# Patient Record
Sex: MALE | Race: WHITE | NOT HISPANIC OR LATINO | Employment: FULL TIME | URBAN - METROPOLITAN AREA
[De-identification: names, ages, dates, MRNs, and addresses within clinical notes are randomized per-mention and may not be internally consistent; named-entity substitution may affect disease eponyms.]

---

## 2018-04-10 LAB — HEMOCCULT STL QL IA: NORMAL

## 2018-04-13 ENCOUNTER — TELEPHONE (OUTPATIENT)
Dept: FAMILY MEDICINE | Facility: CLINIC | Age: 48
End: 2018-04-13

## 2018-04-13 NOTE — TELEPHONE ENCOUNTER
Sammy for patient       ----- Message from Dona Pascual DO sent at 4/13/2018  1:20 PM EDT -----  LeT patient know his stool card was negative for blood

## 2018-06-03 PROBLEM — Z00.00 ROUTINE GENERAL MEDICAL EXAMINATION AT A HEALTH CARE FACILITY: Status: ACTIVE | Noted: 2018-06-03

## 2018-06-03 RX ORDER — AZELASTINE 1 MG/ML
2 SPRAY, METERED NASAL 2 TIMES DAILY
COMMUNITY
Start: 2015-11-03 | End: 2021-07-07 | Stop reason: ALTCHOICE

## 2018-06-04 ENCOUNTER — OFFICE VISIT (OUTPATIENT)
Dept: FAMILY MEDICINE | Facility: CLINIC | Age: 48
End: 2018-06-04
Attending: FAMILY MEDICINE
Payer: COMMERCIAL

## 2018-06-04 VITALS
BODY MASS INDEX: 35.63 KG/M2 | RESPIRATION RATE: 16 BRPM | WEIGHT: 227 LBS | HEART RATE: 54 BPM | DIASTOLIC BLOOD PRESSURE: 70 MMHG | HEIGHT: 67 IN | SYSTOLIC BLOOD PRESSURE: 112 MMHG | TEMPERATURE: 98.2 F

## 2018-06-04 DIAGNOSIS — R41.3 MEMORY DIFFICULTIES: ICD-10-CM

## 2018-06-04 DIAGNOSIS — J30.9 ALLERGIC RHINITIS, UNSPECIFIED CHRONICITY, UNSPECIFIED SEASONALITY, UNSPECIFIED TRIGGER: ICD-10-CM

## 2018-06-04 DIAGNOSIS — E78.6 LOW HDL (UNDER 40): ICD-10-CM

## 2018-06-04 DIAGNOSIS — Z00.00 ROUTINE GENERAL MEDICAL EXAMINATION AT A HEALTH CARE FACILITY: Primary | ICD-10-CM

## 2018-06-04 PROBLEM — M79.672 LEFT FOOT PAIN: Status: ACTIVE | Noted: 2018-06-04

## 2018-06-04 PROCEDURE — 36415 COLL VENOUS BLD VENIPUNCTURE: CPT | Performed by: FAMILY MEDICINE

## 2018-06-04 PROCEDURE — 99396 PREV VISIT EST AGE 40-64: CPT | Mod: 25 | Performed by: FAMILY MEDICINE

## 2018-06-04 RX ORDER — IBUPROFEN AND FAMOTIDINE 26.6; 8 MG/1; MG/1
TABLET ORAL 3 TIMES DAILY
COMMUNITY
End: 2020-06-16 | Stop reason: ALTCHOICE

## 2018-06-04 ASSESSMENT — ENCOUNTER SYMPTOMS
HEMATOLOGIC/LYMPHATIC NEGATIVE: 1
EYES NEGATIVE: 1
GASTROINTESTINAL NEGATIVE: 1
PSYCHIATRIC NEGATIVE: 1
ENDOCRINE NEGATIVE: 1
CARDIOVASCULAR NEGATIVE: 1
CONSTITUTIONAL NEGATIVE: 1

## 2018-06-04 NOTE — ASSESSMENT & PLAN NOTE
Have routine labs  Consider sleep study due to snoring. Per pt only mild though and when sleeps on stomach does not have this  Tdap up to date  Did stool card for family hx of grandparent with colon cancer.  neg

## 2018-06-04 NOTE — PROGRESS NOTES
Subjective      Patient ID: Virgil Mejia is a 47 y.o. male.    Pt is here for a Physical;  He is seeing a podiatrist for left foot pain .  He is on duexis now. Slowly improving.  He feels ok. Denies any specific complaints except occasionally will feel like his memory is a little funny. He will feel like he is in a conversation and will forget what conversation about.  Exercises without chest pain sob dizziness etc.         Tobacco  Allergies  Meds  Problems  Med Hx  Surg Hx  Fam Hx       Review of Systems   Constitutional: Negative.    HENT: Negative.    Eyes: Negative.    Cardiovascular: Negative.    Gastrointestinal: Negative.    Endocrine: Negative.    Genitourinary: Negative.    Musculoskeletal: Positive for gait problem.        Left  Foot pain   Allergic/Immunologic: Positive for environmental allergies.   Hematological: Negative.    Psychiatric/Behavioral: Negative.      Allergies   Allergen Reactions   • No Known Allergies      Current Outpatient Prescriptions   Medication Sig Dispense Refill   • azelastine (ASTELIN) 137 mcg (0.1 %) nasal spray Administer 2 sprays into affected nostril(s) 2 (two) times a day.     • ibuprofen-famotidine (DUEXIS) 800-26.6 mg tablet Take by mouth 3 (three) times a day.       No current facility-administered medications for this visit.      Past Medical History:   Diagnosis Date   • Allergic rhinitis 8/2/2013    Overview:    • Back pain 8/2/2013    Overview:      Past Surgical History:   Procedure Laterality Date   • ELBOW SURGERY Right     right radial head surgery     Social History   Substance Use Topics   • Smoking status: Never Smoker   • Smokeless tobacco: Never Used   • Alcohol use Not on file     Family History   Problem Relation Age of Onset   • Cancer Maternal Grandmother    • Diabetes Maternal Grandmother    • Colon cancer Paternal Grandfather        Objective   Vitals:    06/04/18 0758   BP: 112/70   Pulse: (!) 54   Resp: 16   Temp: 36.8 °C (98.2 °F)   Weight:  "103 kg (227 lb)   Height: 1.689 m (5' 6.5\")    Body mass index is 36.09 kg/m².  Physical Exam   Constitutional: He is oriented to person, place, and time. He appears well-developed and well-nourished.   HENT:   Right Ear: External ear normal.   Left Ear: External ear normal.   Mouth/Throat: Oropharynx is clear and moist.   Eyes: Conjunctivae and EOM are normal. Pupils are equal, round, and reactive to light.   Neck: Normal range of motion. Neck supple.   Cardiovascular: Normal rate, regular rhythm and normal heart sounds.    Pulmonary/Chest: Effort normal and breath sounds normal.   Abdominal: Soft. Bowel sounds are normal.   Genitourinary: Penis normal.   Genitourinary Comments: Scrotal exam neg. No lymph nodes    Musculoskeletal: Normal range of motion.   Neurological: He is alert and oriented to person, place, and time.   Psychiatric: He has a normal mood and affect.       Assessment/Plan   Problem List Items Addressed This Visit     Allergic rhinitis     Prn astelin         Relevant Orders    Comprehensive metabolic panel    Routine general medical examination at a health care facility - Primary     Have routine labs  Consider sleep study due to snoring. Per pt only mild though and when sleeps on stomach does not have this  Tdap up to date  Did stool card for family hx of grandparent with colon cancer.  neg         Relevant Orders    CBC and Differential    Comprehensive metabolic panel    Lipid panel    PSA    TSH 3rd Generation    Urinalysis with microscopic    Vitamin D 25 hydroxy    Memory difficulties     Check labs  Keep log and rtn in 3 months to go over specific complaints         Relevant Orders    Vitamin B12    Vitamin D 25 hydroxy      Other Visit Diagnoses     Low HDL (under 40)        Relevant Orders    Lipid panel          Patient agrees and verbalizes understanding of medication and treatment plan discussed at today's visit.  Patient was instructed to call or follow-up if symptoms persist or " worsen.    No notes on file    Dona Pascual, DO    This note was created with voice recognition software. Inadvertent dictation errors should be disregarded. Please contact my office for clarification.

## 2018-06-05 LAB — VIT B12 SERPL-MCNC: 404 PG/ML (ref 200–1100)

## 2018-06-06 LAB
25(OH)D3 SERPL-MCNC: 21 NG/ML (ref 30–100)
ALBUMIN SERPL-MCNC: 4.2 G/DL (ref 3.6–5.1)
ALBUMIN/GLOB SERPL: 1.9 (CALC) (ref 1–2.5)
ALP SERPL-CCNC: 73 U/L (ref 40–115)
ALT SERPL-CCNC: 24 U/L (ref 9–46)
AST SERPL-CCNC: 21 U/L (ref 10–40)
BASOPHILS # BLD AUTO: 60 CELLS/UL (ref 0–200)
BASOPHILS NFR BLD AUTO: 1.4 %
BILIRUB SERPL-MCNC: 0.4 MG/DL (ref 0.2–1.2)
BUN SERPL-MCNC: 14 MG/DL (ref 7–25)
BUN/CREAT SERPL: NORMAL (CALC) (ref 6–22)
CALCIUM SERPL-MCNC: 9.3 MG/DL (ref 8.6–10.3)
CHLORIDE SERPL-SCNC: 105 MMOL/L (ref 98–110)
CHOLEST SERPL-MCNC: 201 MG/DL
CHOLEST/HDLC SERPL: 6.9 (CALC)
CO2 SERPL-SCNC: 26 MMOL/L (ref 20–31)
CREAT SERPL-MCNC: 1.18 MG/DL (ref 0.6–1.35)
EOSINOPHIL # BLD AUTO: 327 CELLS/UL (ref 15–500)
EOSINOPHIL NFR BLD AUTO: 7.6 %
ERYTHROCYTE [DISTWIDTH] IN BLOOD BY AUTOMATED COUNT: 12.3 % (ref 11–15)
GFR SERPL CREATININE-BSD FRML MDRD: 73 ML/MIN/1.73M2
GLOBULIN SER CALC-MCNC: 2.2 G/DL (CALC) (ref 1.9–3.7)
GLUCOSE SERPL-MCNC: 96 MG/DL (ref 65–99)
HCT VFR BLD AUTO: 42.9 % (ref 38.5–50)
HDLC SERPL-MCNC: 29 MG/DL
HGB BLD-MCNC: 14.9 G/DL (ref 13.2–17.1)
LDLC SERPL CALC-MCNC: 134 MG/DL (CALC)
LYMPHOCYTES # BLD AUTO: 1410 CELLS/UL (ref 850–3900)
LYMPHOCYTES NFR BLD AUTO: 32.8 %
MCH RBC QN AUTO: 32.5 PG (ref 27–33)
MCHC RBC AUTO-ENTMCNC: 34.7 G/DL (ref 32–36)
MCV RBC AUTO: 93.5 FL (ref 80–100)
MONOCYTES # BLD AUTO: 538 CELLS/UL (ref 200–950)
MONOCYTES NFR BLD AUTO: 12.5 %
NEUTROPHILS # BLD AUTO: 1965 CELLS/UL (ref 1500–7800)
NEUTROPHILS NFR BLD AUTO: 45.7 %
NONHDLC SERPL-MCNC: 172 MG/DL (CALC)
PLATELET # BLD AUTO: 225 THOUSAND/UL (ref 140–400)
PMV BLD REES-ECKER: 9.7 FL (ref 7.5–12.5)
POTASSIUM SERPL-SCNC: 4.4 MMOL/L (ref 3.5–5.3)
PROT SERPL-MCNC: 6.4 G/DL (ref 6.1–8.1)
PSA SERPL-MCNC: 0.5 NG/ML
RBC # BLD AUTO: 4.59 MILLION/UL (ref 4.2–5.8)
SODIUM SERPL-SCNC: 139 MMOL/L (ref 135–146)
TRIGL SERPL-MCNC: 238 MG/DL
TSH SERPL-ACNC: 1.76 MIU/L (ref 0.4–4.5)
WBC # BLD AUTO: 4.3 THOUSAND/UL (ref 3.8–10.8)

## 2018-06-07 ENCOUNTER — TELEPHONE (OUTPATIENT)
Dept: FAMILY MEDICINE | Facility: CLINIC | Age: 48
End: 2018-06-07

## 2018-06-07 NOTE — TELEPHONE ENCOUNTER
lmom for  patient        ----- Message from Dona Pascual DO sent at 6/6/2018 11:36 PM EDT -----  Let pt know his CBC is ok, his CMP is ok, his chol is 201   . His bad chol is very low at 29.  ( would suggest starting a fish oil tablet 1000 mg 2 x day)  And re ck the lipids in 6 months The LDL is little high at 134,   Would like < 120 .  His PSA is ok and his TSH is ok . His vit D level is low.  Suggest starting vit d supplement 100 IU daily and a little unprotected sun over the summer to get some vit D is ok   ( ie 10 min sun before putting on sunscreen)

## 2018-06-07 NOTE — PROGRESS NOTES
Let pt know his CBC is ok, his CMP is ok, his chol is 201   . His bad chol is very low at 29.  ( would suggest starting a fish oil tablet 1000 mg 2 x day)  And re ck the lipids in 6 months The LDL is little high at 134,   Would like < 120 .  His PSA is ok and his TSH is ok . His vit D level is low.  Suggest starting vit d supplement 100 IU daily and a little unprotected sun over the summer to get some vit D is ok   ( ie 10 min sun before putting on sunscreen)

## 2018-11-09 ENCOUNTER — TELEPHONE (OUTPATIENT)
Dept: FAMILY MEDICINE | Facility: CLINIC | Age: 48
End: 2018-11-09

## 2019-06-09 NOTE — ASSESSMENT & PLAN NOTE
Labs reviewed  Vit D was low, suppelement and observed, recheck today  B12 was in lower normal range last year.  B12 added.  Recheck today  Patient does not really notice a difference however he does agree it probably is just the busyness of life run I think out of the ordinary.  We will continue to observe with keeping a log

## 2019-06-09 NOTE — ASSESSMENT & PLAN NOTE
Physical today  Labs from last year reviewed and HDL was low  Pt started  fish oil tab and increased exercise. See what HDL is today     Tdap up-to-date  Referral for colonoscopy sent as he will turn 49 in a month

## 2019-06-09 NOTE — ASSESSMENT & PLAN NOTE
Lab Results   Component Value Date    ALT 24 06/04/2018    AST 21 06/04/2018    ALKPHOS 73 06/04/2018    BILITOT 0.4 06/04/2018     Normal now

## 2019-06-10 ENCOUNTER — OFFICE VISIT (OUTPATIENT)
Dept: FAMILY MEDICINE | Facility: CLINIC | Age: 49
End: 2019-06-10
Payer: COMMERCIAL

## 2019-06-10 VITALS
HEART RATE: 72 BPM | SYSTOLIC BLOOD PRESSURE: 116 MMHG | WEIGHT: 233.8 LBS | TEMPERATURE: 97.8 F | DIASTOLIC BLOOD PRESSURE: 80 MMHG | RESPIRATION RATE: 16 BRPM | HEIGHT: 67 IN | BODY MASS INDEX: 36.7 KG/M2

## 2019-06-10 DIAGNOSIS — R41.3 MEMORY DIFFICULTIES: ICD-10-CM

## 2019-06-10 DIAGNOSIS — Z12.11 COLON CANCER SCREENING: ICD-10-CM

## 2019-06-10 DIAGNOSIS — M54.9 ACUTE LEFT-SIDED BACK PAIN, UNSPECIFIED BACK LOCATION: ICD-10-CM

## 2019-06-10 DIAGNOSIS — R74.01 ELEVATED ALT MEASUREMENT: ICD-10-CM

## 2019-06-10 DIAGNOSIS — Z00.00 ROUTINE GENERAL MEDICAL EXAMINATION AT A HEALTH CARE FACILITY: Primary | ICD-10-CM

## 2019-06-10 DIAGNOSIS — J30.9 ALLERGIC RHINITIS, UNSPECIFIED SEASONALITY, UNSPECIFIED TRIGGER: ICD-10-CM

## 2019-06-10 PROBLEM — M79.672 LEFT FOOT PAIN: Status: RESOLVED | Noted: 2018-06-04 | Resolved: 2019-06-10

## 2019-06-10 PROCEDURE — 99396 PREV VISIT EST AGE 40-64: CPT | Performed by: FAMILY MEDICINE

## 2019-06-10 RX ORDER — CYCLOBENZAPRINE HCL 10 MG
10 TABLET ORAL 3 TIMES DAILY PRN
Qty: 30 TABLET | Refills: 1 | Status: SHIPPED | OUTPATIENT
Start: 2019-06-10 | End: 2020-06-16 | Stop reason: SDUPTHER

## 2019-06-10 ASSESSMENT — ENCOUNTER SYMPTOMS
NEUROLOGICAL NEGATIVE: 1
BACK PAIN: 1
CONSTITUTIONAL NEGATIVE: 1
PSYCHIATRIC NEGATIVE: 1
GASTROINTESTINAL NEGATIVE: 1
RESPIRATORY NEGATIVE: 1
CARDIOVASCULAR NEGATIVE: 1
HEMATOLOGIC/LYMPHATIC NEGATIVE: 1

## 2019-06-10 NOTE — ASSESSMENT & PLAN NOTE
Add muscle relaxant.  Okay to use ibuprofen as needed  Would like to have forms completed to get coverage for accupuncture which helps.  Ambulatory referral to acupuncture given

## 2019-06-10 NOTE — PROGRESS NOTES
"Subjective      Patient ID: Virgil Mejia is a 48 y.o. male.    Pt is here for physica.  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  He hurt his back a little.  Taking 8 Ibuprofen a day now.  Not improving.   Stretching         Tobacco  Meds  Med Hx  Surg Hx  Fam Hx       Review of Systems   Constitutional: Negative.    HENT: Negative.    Respiratory: Negative.    Cardiovascular: Negative.    Gastrointestinal: Negative.    Musculoskeletal: Positive for back pain.   Skin: Negative.    Neurological: Negative.    Hematological: Negative.    Psychiatric/Behavioral: Negative.      Allergies   Allergen Reactions   • No Known Allergies      Current Outpatient Prescriptions   Medication Sig Dispense Refill   • azelastine (ASTELIN) 137 mcg (0.1 %) nasal spray Administer 2 sprays into affected nostril(s) 2 (two) times a day.     • cyclobenzaprine (FLEXERIL) 10 mg tablet Take 1 tablet (10 mg total) by mouth 3 (three) times a day as needed for muscle spasms. 30 tablet 1   • ibuprofen-famotidine (DUEXIS) 800-26.6 mg tablet Take by mouth 3 (three) times a day.       No current facility-administered medications for this visit.      Past Medical History:   Diagnosis Date   • Allergic rhinitis 8/2/2013    Overview:    • Back pain 8/2/2013    Overview:      Past Surgical History:   Procedure Laterality Date   • ELBOW SURGERY Right     right radial head surgery     Social History   Substance Use Topics   • Smoking status: Never Smoker   • Smokeless tobacco: Never Used   • Alcohol use Yes      Comment: socially     Family History   Problem Relation Age of Onset   • Cancer Maternal Grandmother    • Diabetes Maternal Grandmother    • Colon cancer Paternal Grandfather        Objective   Vitals:    06/10/19 0808   BP: 116/80   Pulse: 72   Resp: 16   Temp: 36.6 °C (97.8 °F)   Weight: 106 kg (233 lb 12.8 oz)   Height: 1.689 m (5' 6.5\")    Body mass index is 37.17 kg/m².  Physical Exam   Constitutional: He appears well-developed " and well-nourished.   HENT:   Head: Normocephalic.   Eyes: Pupils are equal, round, and reactive to light.   Neck: Normal range of motion.   Cardiovascular: Normal rate and regular rhythm.    Pulmonary/Chest: Effort normal and breath sounds normal.   Abdominal: Soft.   Musculoskeletal: Normal range of motion. He exhibits tenderness (right SI low back pain ).   Neurological: He is alert.   Skin: Skin is warm.   Psychiatric: He has a normal mood and affect.   Vitals reviewed.      Assessment/Plan   Problem List Items Addressed This Visit     Allergic rhinitis     Prn astelin         Back pain     Add muscle relaxant.  Okay to use ibuprofen as needed  Would like to have forms completed to get coverage for accupuncture which helps.  Ambulatory referral to acupuncture given         Relevant Medications    cyclobenzaprine (FLEXERIL) 10 mg tablet    Other Relevant Orders    Ambulatory referral for Acupuncture    Elevated ALT measurement     Lab Results   Component Value Date    ALT 24 06/04/2018    AST 21 06/04/2018    ALKPHOS 73 06/04/2018    BILITOT 0.4 06/04/2018     Normal now         Routine general medical examination at a health care facility - Primary     Physical today  Labs from last year reviewed and HDL was low  Pt started  fish oil tab and increased exercise. See what HDL is today     Tdap up-to-date  Referral for colonoscopy sent as he will turn 49 in a month           Relevant Orders    CBC and Differential    Comprehensive metabolic panel    Lipid panel    TSH 3rd Generation    Urinalysis with microscopic    PSA    BMI 36.0-36.9,adult     Suggest regular exercise  Increase aerobic exercise          Memory difficulties     Labs reviewed  Vit D was low, suppelement and observed, recheck today  B12 was in lower normal range last year.  B12 added.  Recheck today  Patient does not really notice a difference however he does agree it probably is just the busyness of life run I think out of the ordinary.  We will  continue to observe with keeping a log         Relevant Orders    CBC and Differential    Comprehensive metabolic panel    Lipid panel    TSH 3rd Generation    Urinalysis with microscopic    PSA      Other Visit Diagnoses     Colon cancer screening        Relevant Orders    [facility] Colonoscopy [facility]          Patient agrees and verbalizes understanding of medication and treatment plan discussed at today's visit.  Patient was instructed to call or follow-up if symptoms persist or worsen.    No notes on file    Dona Pascual, DO      This note was created with voice recognition software. Inadvertent dictation errors should be disregarded. Please contact my office for clarification.

## 2019-06-11 LAB
ALBUMIN SERPL-MCNC: 4.1 G/DL (ref 3.6–5.1)
ALBUMIN/GLOB SERPL: 1.6 (CALC) (ref 1–2.5)
ALP SERPL-CCNC: 75 U/L (ref 40–115)
ALT SERPL-CCNC: 44 U/L (ref 9–46)
AMORPH SED URNS QL MICRO: (no result) /HPF
APPEARANCE UR: (no result)
AST SERPL-CCNC: 41 U/L (ref 10–40)
BACTERIA #/AREA URNS HPF: (no result) /HPF
BASOPHILS # BLD AUTO: 38 CELLS/UL (ref 0–200)
BASOPHILS NFR BLD AUTO: 0.5 %
BILIRUB SERPL-MCNC: 0.6 MG/DL (ref 0.2–1.2)
BILIRUB UR QL STRIP: NEGATIVE
BUN SERPL-MCNC: 17 MG/DL (ref 7–25)
BUN/CREAT SERPL: ABNORMAL (CALC) (ref 6–22)
CALCIUM SERPL-MCNC: 9.4 MG/DL (ref 8.6–10.3)
CHLORIDE SERPL-SCNC: 104 MMOL/L (ref 98–110)
CHOLEST SERPL-MCNC: 219 MG/DL
CHOLEST/HDLC SERPL: 6.8 (CALC)
CO2 SERPL-SCNC: 25 MMOL/L (ref 20–32)
COLOR UR: (no result)
CREAT SERPL-MCNC: 1.22 MG/DL (ref 0.6–1.35)
EOSINOPHIL # BLD AUTO: 315 CELLS/UL (ref 15–500)
EOSINOPHIL NFR BLD AUTO: 4.2 %
ERYTHROCYTE [DISTWIDTH] IN BLOOD BY AUTOMATED COUNT: 12.7 % (ref 11–15)
GLOBULIN SER CALC-MCNC: 2.6 G/DL (CALC) (ref 1.9–3.7)
GLUCOSE SERPL-MCNC: 96 MG/DL (ref 65–99)
GLUCOSE UR QL STRIP: NEGATIVE
HCT VFR BLD AUTO: 44.4 % (ref 38.5–50)
HDLC SERPL-MCNC: 32 MG/DL
HGB BLD-MCNC: 15.3 G/DL (ref 13.2–17.1)
HGB UR QL STRIP: NEGATIVE
HYALINE CASTS #/AREA URNS LPF: (no result) /LPF
KETONES UR QL STRIP: (no result)
LDLC SERPL CALC-MCNC: 148 MG/DL (CALC)
LEUKOCYTE ESTERASE UR QL STRIP: NEGATIVE
LYMPHOCYTES # BLD AUTO: 1155 CELLS/UL (ref 850–3900)
LYMPHOCYTES NFR BLD AUTO: 15.4 %
MCH RBC QN AUTO: 31.4 PG (ref 27–33)
MCHC RBC AUTO-ENTMCNC: 34.5 G/DL (ref 32–36)
MCV RBC AUTO: 91.2 FL (ref 80–100)
MONOCYTES # BLD AUTO: 623 CELLS/UL (ref 200–950)
MONOCYTES NFR BLD AUTO: 8.3 %
NEUTROPHILS # BLD AUTO: 5370 CELLS/UL (ref 1500–7800)
NEUTROPHILS NFR BLD AUTO: 71.6 %
NITRITE UR QL STRIP: NEGATIVE
NONHDLC SERPL-MCNC: 187 MG/DL (CALC)
PH UR STRIP: 5.5 [PH] (ref 5–8)
PLATELET # BLD AUTO: 230 THOUSAND/UL (ref 140–400)
PMV BLD REES-ECKER: 9.5 FL (ref 7.5–12.5)
POTASSIUM SERPL-SCNC: 4.3 MMOL/L (ref 3.5–5.3)
PROT SERPL-MCNC: 6.7 G/DL (ref 6.1–8.1)
PROT UR QL STRIP: NEGATIVE
PSA SERPL-MCNC: 0.5 NG/ML
QUEST EGFR NON-AFR. AMERICAN: 69 ML/MIN/1.73M2
RBC # BLD AUTO: 4.87 MILLION/UL (ref 4.2–5.8)
RBC #/AREA URNS HPF: (no result) /HPF
SERVICE CMNT-IMP: (no result)
SODIUM SERPL-SCNC: 139 MMOL/L (ref 135–146)
SP GR UR STRIP: 1.03 (ref 1–1.03)
SQUAMOUS #/AREA URNS HPF: (no result) /HPF
TRIGL SERPL-MCNC: 253 MG/DL
TSH SERPL-ACNC: 1.76 MIU/L (ref 0.4–4.5)
WBC # BLD AUTO: 7.5 THOUSAND/UL (ref 3.8–10.8)
WBC #/AREA URNS HPF: (no result) /HPF

## 2020-01-13 RX ORDER — OSELTAMIVIR PHOSPHATE 75 MG/1
75 CAPSULE ORAL DAILY
Qty: 10 CAPSULE | Refills: 0 | Status: SHIPPED | OUTPATIENT
Start: 2020-01-13 | End: 2020-01-23

## 2020-06-16 ENCOUNTER — OFFICE VISIT (OUTPATIENT)
Dept: FAMILY MEDICINE | Facility: CLINIC | Age: 50
End: 2020-06-16
Payer: COMMERCIAL

## 2020-06-16 ENCOUNTER — TELEPHONE (OUTPATIENT)
Dept: FAMILY MEDICINE | Facility: CLINIC | Age: 50
End: 2020-06-16

## 2020-06-16 VITALS
HEART RATE: 72 BPM | DIASTOLIC BLOOD PRESSURE: 82 MMHG | SYSTOLIC BLOOD PRESSURE: 126 MMHG | WEIGHT: 236 LBS | RESPIRATION RATE: 14 BRPM | BODY MASS INDEX: 37.04 KG/M2 | TEMPERATURE: 96.1 F | HEIGHT: 67 IN

## 2020-06-16 DIAGNOSIS — M54.9 ACUTE LEFT-SIDED BACK PAIN, UNSPECIFIED BACK LOCATION: ICD-10-CM

## 2020-06-16 DIAGNOSIS — J30.9 ALLERGIC RHINITIS, UNSPECIFIED SEASONALITY, UNSPECIFIED TRIGGER: ICD-10-CM

## 2020-06-16 DIAGNOSIS — R79.89 LOW VITAMIN D LEVEL: ICD-10-CM

## 2020-06-16 DIAGNOSIS — Z12.11 COLON CANCER SCREENING: ICD-10-CM

## 2020-06-16 DIAGNOSIS — Z86.69 H/O TINNITUS: ICD-10-CM

## 2020-06-16 DIAGNOSIS — Z00.00 ROUTINE GENERAL MEDICAL EXAMINATION AT A HEALTH CARE FACILITY: Primary | ICD-10-CM

## 2020-06-16 DIAGNOSIS — R74.01 ELEVATED ALT MEASUREMENT: ICD-10-CM

## 2020-06-16 PROBLEM — R41.3 MEMORY DIFFICULTIES: Status: RESOLVED | Noted: 2018-06-04 | Resolved: 2020-06-16

## 2020-06-16 PROCEDURE — 36415 COLL VENOUS BLD VENIPUNCTURE: CPT | Performed by: FAMILY MEDICINE

## 2020-06-16 PROCEDURE — 99396 PREV VISIT EST AGE 40-64: CPT | Performed by: FAMILY MEDICINE

## 2020-06-16 RX ORDER — CYCLOBENZAPRINE HCL 10 MG
10 TABLET ORAL 3 TIMES DAILY PRN
Qty: 30 TABLET | Refills: 0 | Status: SHIPPED | OUTPATIENT
Start: 2020-06-16 | End: 2020-06-16 | Stop reason: ALTCHOICE

## 2020-06-16 RX ORDER — METAXALONE 800 MG/1
800 TABLET ORAL 3 TIMES DAILY
Qty: 30 TABLET | Refills: 0 | Status: SHIPPED | OUTPATIENT
Start: 2020-06-16 | End: 2020-06-26

## 2020-06-16 ASSESSMENT — ENCOUNTER SYMPTOMS
ENDOCRINE NEGATIVE: 1
EYES NEGATIVE: 1
CONSTIPATION: 0
ABDOMINAL DISTENTION: 0
MUSCULOSKELETAL NEGATIVE: 1
NEUROLOGICAL NEGATIVE: 1
RESPIRATORY NEGATIVE: 1
CARDIOVASCULAR NEGATIVE: 1
DIARRHEA: 0
CONSTITUTIONAL NEGATIVE: 1
BLOOD IN STOOL: 1
NAUSEA: 0
ANAL BLEEDING: 0
PSYCHIATRIC NEGATIVE: 1

## 2020-06-16 NOTE — ASSESSMENT & PLAN NOTE
Constant.  Does yoga.  Will use prn flexeril and or advil   Feels like flexeril does not help so changed to trial of skelaxin  Stretch/weight loss

## 2020-06-16 NOTE — ASSESSMENT & PLAN NOTE
All the time - no significant change  Has seen audiologist in past  - had mild hearing loss  Has seen ENT, can go back for repeat hearing test and evaluation

## 2020-06-16 NOTE — PROGRESS NOTES
Subjective      Patient ID: Virgil Mejia is a 49 y.o. male.    Pt is here for his physical. He is doing well in general but does admit to some muscle aches at times related to his lifting and working out. He is having some left achilles pain that just started.  Will make appt with ortho, sees [facility]  Sometimes he will  pain in the chest out of the blue but it is not exertional.  Also some nerve pain on left arm that shoots down arm when leans on desk,  Never has any chest pain with exertion.  Works out 4-5 x week , cardio and weights.  He has not had colonoscopy yet.  Occasionally will have bright red blood with itching.       Tobacco  Allergies  Meds  Problems  Med Hx  Surg Hx  Fam Hx       Review of Systems   Constitutional: Negative.    HENT: Negative.    Eyes: Negative.    Respiratory: Negative.    Cardiovascular: Negative.    Gastrointestinal: Positive for blood in stool (occasional with wipt, thinks hemmorhoid). Negative for abdominal distention, anal bleeding, constipation, diarrhea and nausea.   Endocrine: Negative.    Genitourinary: Negative.    Musculoskeletal: Negative.    Skin: Negative.    Allergic/Immunologic: Positive for environmental allergies (seasonal).   Neurological: Negative.    Psychiatric/Behavioral: Negative.      Allergies   Allergen Reactions   • No Known Allergies      Current Outpatient Medications   Medication Sig Dispense Refill   • azelastine (ASTELIN) 137 mcg (0.1 %) nasal spray Administer 2 sprays into affected nostril(s) 2 (two) times a day.     • metaxalone (SKELAXIN) 800 mg tablet Take 1 tablet (800 mg total) by mouth 3 (three) times a day for 10 days. 30 tablet 0     No current facility-administered medications for this visit.      Past Medical History:   Diagnosis Date   • Allergic rhinitis 8/2/2013    Overview:    • Back pain 8/2/2013    Overview:      Past Surgical History:   Procedure Laterality Date   • ELBOW SURGERY Right     right radial head  "surgery     Social History     Tobacco Use   • Smoking status: Never Smoker   • Smokeless tobacco: Never Used   Substance Use Topics   • Alcohol use: Yes     Comment: socially   • Drug use: Not on file     Family History   Problem Relation Age of Onset   • Cancer Maternal Grandmother    • Diabetes Maternal Grandmother    • Colon cancer Paternal Grandfather        Objective   Vitals:    06/16/20 0758   BP: 126/82   Pulse: 72   Resp: 14   Temp: (!) 35.6 °C (96.1 °F)   Weight: 107 kg (236 lb)   Height: 1.702 m (5' 7\")    Body mass index is 36.96 kg/m².  Physical Exam   Constitutional: He is oriented to person, place, and time. He appears well-developed and well-nourished.   HENT:   Head: Normocephalic and atraumatic.   Mouth/Throat: Oropharynx is clear and moist.   Eyes: Pupils are equal, round, and reactive to light. EOM are normal.   Neck: Normal range of motion. Neck supple. No thyromegaly present.   Cardiovascular: Normal rate and regular rhythm.   No murmur heard.  Pulmonary/Chest: Effort normal and breath sounds normal.   Abdominal: Soft. Bowel sounds are normal.   Genitourinary: Rectum normal, prostate normal and penis normal. Rectal exam shows guaiac negative stool.   Musculoskeletal: Normal range of motion.   Lymphadenopathy:     He has no cervical adenopathy.   Neurological: He is alert and oriented to person, place, and time. He displays normal reflexes.   Skin: Skin is warm and dry.   Psychiatric: He has a normal mood and affect.   Vitals reviewed.      Assessment/Plan   Problem List Items Addressed This Visit        Nervous    Back pain     Constant.  Does yoga.  Will use prn flexeril and or advil   Feels like flexeril does not help so changed to trial of skelaxin  Stretch/weight loss            Respiratory    Allergic rhinitis     Not using any meds  otc as needed          Relevant Orders    CBC and Differential    Comprehensive metabolic panel    Lipid panel    TSH 3rd Generation    Urinalysis with " microscopic    PSA    Vitamin B12    Vitamin D 25 hydroxy       Other    Elevated ALT measurement     Re ck due today      Lab Results   Component Value Date    ALT 44 06/10/2019    AST 41 (H) 06/10/2019    ALKPHOS 75 06/10/2019    BILITOT 0.6 06/10/2019              H/O tinnitus     All the time - no significant change  Has seen audiologist in past  - had mild hearing loss  Has seen ENT, can go back for repeat hearing test and evaluation          Routine general medical examination at a health care facility - Primary     Pt will have routine labs  Referred to GI for routine colonoscopy - Dr Garcia  Have PSA with routine labs.  Up to date with Tdap  Pt had shingles per hx ( consider immunization)          Relevant Orders    CBC and Differential    Comprehensive metabolic panel    Lipid panel    TSH 3rd Generation    Urinalysis with microscopic    PSA    Vitamin B12    Vitamin D 25 hydroxy    Ambulatory referral to Gastroenterology    BMI 36.0-36.9,adult     Regular aerobic exercise . Healthy diet recommended  Labs ordered          Low vitamin D level     Last level was low 21  Check level          Relevant Orders    Vitamin D 25 hydroxy    Colon cancer screening     Referred to Dr Garcia to call for colonoscopy  Pt will schedule  Rectal exam neg for occult blood           Relevant Orders    Ambulatory referral to Gastroenterology      n    Patient agrees and verbalizes understanding of medication and treatment plan discussed at today's visit.  Patient was instructed to call or follow-up if symptoms persist or worsen.    No notes on file    Dona Pascual, DO      This note was created with voice recognition software. Inadvertent dictation errors should be disregarded. Please contact my office for clarification.

## 2020-06-16 NOTE — TELEPHONE ENCOUNTER
----- Message from Dona Pascual DO sent at 6/16/2020  8:30 AM EDT -----  Can u cancel the cyclobenzeprine

## 2020-06-16 NOTE — ASSESSMENT & PLAN NOTE
Referred to Dr Garcia to call for colonoscopy  Pt will schedule  Rectal exam neg for occult blood

## 2020-06-16 NOTE — ASSESSMENT & PLAN NOTE
Re ck due today      Lab Results   Component Value Date    ALT 44 06/10/2019    AST 41 (H) 06/10/2019    ALKPHOS 75 06/10/2019    BILITOT 0.6 06/10/2019

## 2020-06-16 NOTE — ASSESSMENT & PLAN NOTE
Pt will have routine labs  Referred to GI for routine colonoscopy - Dr Garcia  Have PSA with routine labs.  Up to date with Tdap  Pt had shingles per hx ( consider immunization)

## 2020-06-17 LAB
25(OH)D3 SERPL-MCNC: 22 NG/ML (ref 30–100)
ALBUMIN SERPL-MCNC: 4 G/DL (ref 3.6–5.1)
ALBUMIN/GLOB SERPL: 1.7 (CALC) (ref 1–2.5)
ALP SERPL-CCNC: 72 U/L (ref 35–144)
ALT SERPL-CCNC: 30 U/L (ref 9–46)
APPEARANCE UR: ABNORMAL
AST SERPL-CCNC: 23 U/L (ref 10–35)
BACTERIA #/AREA URNS HPF: ABNORMAL /HPF
BASOPHILS # BLD AUTO: 49 CELLS/UL (ref 0–200)
BASOPHILS NFR BLD AUTO: 0.9 %
BILIRUB SERPL-MCNC: 0.4 MG/DL (ref 0.2–1.2)
BILIRUB UR QL STRIP: NEGATIVE
BUN SERPL-MCNC: 12 MG/DL (ref 7–25)
BUN/CREAT SERPL: NORMAL (CALC) (ref 6–22)
CALCIUM SERPL-MCNC: 9.2 MG/DL (ref 8.6–10.3)
CHLORIDE SERPL-SCNC: 106 MMOL/L (ref 98–110)
CHOLEST SERPL-MCNC: 218 MG/DL
CHOLEST/HDLC SERPL: 7 (CALC)
CO2 SERPL-SCNC: 25 MMOL/L (ref 20–32)
COLOR UR: YELLOW
CREAT SERPL-MCNC: 1.13 MG/DL (ref 0.7–1.33)
EOSINOPHIL # BLD AUTO: 340 CELLS/UL (ref 15–500)
EOSINOPHIL NFR BLD AUTO: 6.3 %
ERYTHROCYTE [DISTWIDTH] IN BLOOD BY AUTOMATED COUNT: 12.5 % (ref 11–15)
GLOBULIN SER CALC-MCNC: 2.4 G/DL (CALC) (ref 1.9–3.7)
GLUCOSE SERPL-MCNC: 97 MG/DL (ref 65–99)
GLUCOSE UR QL STRIP: NEGATIVE
HCT VFR BLD AUTO: 44.3 % (ref 38.5–50)
HDLC SERPL-MCNC: 31 MG/DL
HGB BLD-MCNC: 15.3 G/DL (ref 13.2–17.1)
HGB UR QL STRIP: NEGATIVE
HYALINE CASTS #/AREA URNS LPF: ABNORMAL /LPF
KETONES UR QL STRIP: NEGATIVE
LDLC SERPL CALC-MCNC: 144 MG/DL (CALC)
LEUKOCYTE ESTERASE UR QL STRIP: NEGATIVE
LYMPHOCYTES # BLD AUTO: 1690 CELLS/UL (ref 850–3900)
LYMPHOCYTES NFR BLD AUTO: 31.3 %
MCH RBC QN AUTO: 31.7 PG (ref 27–33)
MCHC RBC AUTO-ENTMCNC: 34.5 G/DL (ref 32–36)
MCV RBC AUTO: 91.7 FL (ref 80–100)
MONOCYTES # BLD AUTO: 562 CELLS/UL (ref 200–950)
MONOCYTES NFR BLD AUTO: 10.4 %
NEUTROPHILS # BLD AUTO: 2759 CELLS/UL (ref 1500–7800)
NEUTROPHILS NFR BLD AUTO: 51.1 %
NITRITE UR QL STRIP: NEGATIVE
NONHDLC SERPL-MCNC: 187 MG/DL (CALC)
PH UR STRIP: 5.5 [PH] (ref 5–8)
PLATELET # BLD AUTO: 254 THOUSAND/UL (ref 140–400)
PMV BLD REES-ECKER: 9.4 FL (ref 7.5–12.5)
POTASSIUM SERPL-SCNC: 4.2 MMOL/L (ref 3.5–5.3)
PROT SERPL-MCNC: 6.4 G/DL (ref 6.1–8.1)
PROT UR QL STRIP: NEGATIVE
PSA SERPL-MCNC: 0.5 NG/ML
QUEST EGFR NON-AFR. AMERICAN: 75 ML/MIN/1.73M2
RBC # BLD AUTO: 4.83 MILLION/UL (ref 4.2–5.8)
RBC #/AREA URNS HPF: ABNORMAL /HPF
SODIUM SERPL-SCNC: 138 MMOL/L (ref 135–146)
SP GR UR STRIP: 1.02 (ref 1–1.03)
SQUAMOUS #/AREA URNS HPF: ABNORMAL /HPF
TRIGL SERPL-MCNC: 303 MG/DL
TSH SERPL-ACNC: 2.53 MIU/L (ref 0.4–4.5)
VIT B12 SERPL-MCNC: 361 PG/ML (ref 200–1100)
WBC # BLD AUTO: 5.4 THOUSAND/UL (ref 3.8–10.8)
WBC #/AREA URNS HPF: ABNORMAL /HPF

## 2021-06-29 ENCOUNTER — TELEPHONE (OUTPATIENT)
Dept: FAMILY MEDICINE | Facility: CLINIC | Age: 51
End: 2021-06-29

## 2021-06-29 DIAGNOSIS — R74.01 ELEVATED ALT MEASUREMENT: ICD-10-CM

## 2021-06-29 DIAGNOSIS — R79.89 LOW VITAMIN D LEVEL: ICD-10-CM

## 2021-06-29 DIAGNOSIS — Z00.00 ROUTINE GENERAL MEDICAL EXAMINATION AT A HEALTH CARE FACILITY: Primary | ICD-10-CM

## 2021-06-29 NOTE — TELEPHONE ENCOUNTER
Patient called asking for a lab slip (quest) to have his fbw done prior to his physical with you. He would like to go asap for this as his appointment with you is 7/7/21

## 2021-07-02 LAB
25(OH)D3 SERPL-MCNC: 36 NG/ML (ref 30–100)
ALBUMIN SERPL-MCNC: 4.2 G/DL (ref 3.6–5.1)
ALBUMIN/GLOB SERPL: 1.8 (CALC) (ref 1–2.5)
ALP SERPL-CCNC: 70 U/L (ref 35–144)
ALT SERPL-CCNC: 21 U/L (ref 9–46)
APPEARANCE UR: CLEAR
AST SERPL-CCNC: 35 U/L (ref 10–35)
BACTERIA #/AREA URNS HPF: NORMAL /HPF
BASOPHILS # BLD AUTO: 53 CELLS/UL (ref 0–200)
BASOPHILS NFR BLD AUTO: 0.7 %
BILIRUB SERPL-MCNC: 0.5 MG/DL (ref 0.2–1.2)
BILIRUB UR QL STRIP: NEGATIVE
BUN SERPL-MCNC: 21 MG/DL (ref 7–25)
BUN/CREAT SERPL: NORMAL (CALC) (ref 6–22)
CALCIUM SERPL-MCNC: 9.6 MG/DL (ref 8.6–10.3)
CHLORIDE SERPL-SCNC: 103 MMOL/L (ref 98–110)
CHOLEST SERPL-MCNC: 198 MG/DL
CHOLEST/HDLC SERPL: 4.5 (CALC)
CO2 SERPL-SCNC: 28 MMOL/L (ref 20–32)
COLOR UR: YELLOW
CREAT SERPL-MCNC: 1.16 MG/DL (ref 0.7–1.33)
EOSINOPHIL # BLD AUTO: 180 CELLS/UL (ref 15–500)
EOSINOPHIL NFR BLD AUTO: 2.4 %
ERYTHROCYTE [DISTWIDTH] IN BLOOD BY AUTOMATED COUNT: 12.3 % (ref 11–15)
GLOBULIN SER CALC-MCNC: 2.3 G/DL (CALC) (ref 1.9–3.7)
GLUCOSE SERPL-MCNC: 92 MG/DL (ref 65–99)
GLUCOSE UR QL STRIP: NEGATIVE
HBA1C MFR BLD: 5.1 % OF TOTAL HGB
HCT VFR BLD AUTO: 42.1 % (ref 38.5–50)
HDLC SERPL-MCNC: 44 MG/DL
HGB BLD-MCNC: 13.9 G/DL (ref 13.2–17.1)
HGB UR QL STRIP: NEGATIVE
HYALINE CASTS #/AREA URNS LPF: NORMAL /LPF
KETONES UR QL STRIP: NEGATIVE
LDLC SERPL CALC-MCNC: 137 MG/DL (CALC)
LEUKOCYTE ESTERASE UR QL STRIP: NEGATIVE
LYMPHOCYTES # BLD AUTO: 1568 CELLS/UL (ref 850–3900)
LYMPHOCYTES NFR BLD AUTO: 20.9 %
MCH RBC QN AUTO: 31 PG (ref 27–33)
MCHC RBC AUTO-ENTMCNC: 33 G/DL (ref 32–36)
MCV RBC AUTO: 94 FL (ref 80–100)
MONOCYTES # BLD AUTO: 518 CELLS/UL (ref 200–950)
MONOCYTES NFR BLD AUTO: 6.9 %
NEUTROPHILS # BLD AUTO: 5183 CELLS/UL (ref 1500–7800)
NEUTROPHILS NFR BLD AUTO: 69.1 %
NITRITE UR QL STRIP: NEGATIVE
NONHDLC SERPL-MCNC: 154 MG/DL (CALC)
PH UR STRIP: 5.5 [PH] (ref 5–8)
PLATELET # BLD AUTO: 233 THOUSAND/UL (ref 140–400)
PMV BLD REES-ECKER: 9.6 FL (ref 7.5–12.5)
POTASSIUM SERPL-SCNC: 4.7 MMOL/L (ref 3.5–5.3)
PROT SERPL-MCNC: 6.5 G/DL (ref 6.1–8.1)
PROT UR QL STRIP: NEGATIVE
PSA SERPL-MCNC: 0.5 NG/ML
QUEST EGFR AFRICAN AMERICAN: 84 ML/MIN/1.73M2
QUEST EGFR NON-AFR. AMERICAN: 73 ML/MIN/1.73M2
RBC # BLD AUTO: 4.48 MILLION/UL (ref 4.2–5.8)
RBC #/AREA URNS HPF: NORMAL /HPF
SODIUM SERPL-SCNC: 138 MMOL/L (ref 135–146)
SP GR UR STRIP: 1.02 (ref 1–1.03)
SQUAMOUS #/AREA URNS HPF: NORMAL /HPF
TRIGL SERPL-MCNC: 73 MG/DL
TSH SERPL-ACNC: 2.21 MIU/L (ref 0.4–4.5)
WBC # BLD AUTO: 7.5 THOUSAND/UL (ref 3.8–10.8)
WBC #/AREA URNS HPF: NORMAL /HPF

## 2021-07-06 NOTE — RESULT ENCOUNTER NOTE
Virgil  Your labs were ok. TSH normal Your cholesterol was 198 and the LDL was 137. The Vitamin D was in a normal range. The HA1c was normal at 5.1 . The urinalysis was ok. The TSH was normal. PSA normal

## 2021-07-07 ENCOUNTER — OFFICE VISIT (OUTPATIENT)
Dept: FAMILY MEDICINE | Facility: CLINIC | Age: 51
End: 2021-07-07
Payer: COMMERCIAL

## 2021-07-07 VITALS
HEIGHT: 67 IN | SYSTOLIC BLOOD PRESSURE: 108 MMHG | WEIGHT: 210.8 LBS | RESPIRATION RATE: 15 BRPM | DIASTOLIC BLOOD PRESSURE: 70 MMHG | BODY MASS INDEX: 33.09 KG/M2 | HEART RATE: 58 BPM

## 2021-07-07 DIAGNOSIS — Z00.00 ROUTINE GENERAL MEDICAL EXAMINATION AT A HEALTH CARE FACILITY: Primary | ICD-10-CM

## 2021-07-07 DIAGNOSIS — R79.89 LOW VITAMIN D LEVEL: ICD-10-CM

## 2021-07-07 DIAGNOSIS — R74.01 ELEVATED ALT MEASUREMENT: ICD-10-CM

## 2021-07-07 DIAGNOSIS — M54.9 ACUTE LEFT-SIDED BACK PAIN, UNSPECIFIED BACK LOCATION: ICD-10-CM

## 2021-07-07 DIAGNOSIS — Z12.11 COLON CANCER SCREENING: ICD-10-CM

## 2021-07-07 PROCEDURE — 99396 PREV VISIT EST AGE 40-64: CPT | Performed by: FAMILY MEDICINE

## 2021-07-07 PROCEDURE — 3008F BODY MASS INDEX DOCD: CPT | Performed by: FAMILY MEDICINE

## 2021-07-07 RX ORDER — CHOLECALCIFEROL (VITAMIN D3) 25 MCG
1000 TABLET ORAL DAILY
COMMUNITY

## 2021-07-07 ASSESSMENT — ENCOUNTER SYMPTOMS
CARDIOVASCULAR NEGATIVE: 1
NEUROLOGICAL NEGATIVE: 1
HEMATOLOGIC/LYMPHATIC NEGATIVE: 1
ALLERGIC/IMMUNOLOGIC NEGATIVE: 1
EYES NEGATIVE: 1
GASTROINTESTINAL NEGATIVE: 1
CONSTITUTIONAL NEGATIVE: 1
ENDOCRINE NEGATIVE: 1
PSYCHIATRIC NEGATIVE: 1
RESPIRATORY NEGATIVE: 1
MUSCULOSKELETAL NEGATIVE: 1

## 2021-07-07 NOTE — ASSESSMENT & PLAN NOTE
Vit D level is now in normal range.  Continue multivitamin with vitamin D supplement as the your level is within the normal range   [FreeTextEntry1] : 5/26/20 CT A/P:\par When compared to previous recent examination there is a new radiographic finding of a mid small bowel lesion with post symmetrical and asymmetrical thickening of the bowel wall without significant surrounding inflammatory process or definable intussusception. Given the focal nature this is more suspicious for small bowel tumor as opposed to an inflammatory process. In a patient with recently diagnosed esophageal cancer marked thickening GE junction is seen without definable tumor. There is evidence of small gastrohepatic ligament lymph nodes measuring up to 1.2 cm in size.\par \par 5/20/20 Pathology: \par Distal esophagus "mass" biopsy --Esophagus, distal mass, biopsy \par - Fragments of moderately-differentiated adenocarcinoma.\par - Tumor undermines squamous mucosa.\par \par 7/8/19 pathology: Colon polyp x 2,  rectum, biopsy\par - Sessile serrated Adenoma x 2 fragments\par - No high grade lesion identified

## 2021-07-07 NOTE — ASSESSMENT & PLAN NOTE
Patient did have a BMI in the 36 sebastian.  This did reduce to 33 range BMI improved to 33/. patient will be continuing aerobic exercise and moderation in diet

## 2021-07-07 NOTE — PROGRESS NOTES
"Subjective      Patient ID: Virgil Mejia is a 50 y.o. male.    Pt is here for his physical . Had labs. Rides and runs and doess cross fit.  Feel good with this. Denies chest pain or sob with dizziness etc.        Tobacco  Meds  Problems  Med Hx  Surg Hx  Fam Hx  Soc Hx      Review of Systems   Constitutional: Negative.    HENT: Negative.    Eyes: Negative.    Respiratory: Negative.    Cardiovascular: Negative.    Gastrointestinal: Negative.    Endocrine: Negative.    Genitourinary: Negative.    Musculoskeletal: Negative.    Skin: Negative.    Allergic/Immunologic: Negative.    Neurological: Negative.    Hematological: Negative.    Psychiatric/Behavioral: Negative.      Allergies   Allergen Reactions   • No Known Allergies      Current Outpatient Medications   Medication Sig Dispense Refill   • cholecalciferol, vitamin D3, 1,000 unit (25 mcg) tablet Take 1,000 Units by mouth daily.     • loratadine 10 mg capsule Take 10 mg by mouth daily.     • multivitamin capsule Take 1 capsule by mouth daily.       No current facility-administered medications for this visit.     Past Medical History:   Diagnosis Date   • Allergic rhinitis 8/2/2013    Overview:    • Back pain 8/2/2013    Overview:    • Colon polyp 06/17/2021    colon polyp Dr Tate     Past Surgical History:   Procedure Laterality Date   • ELBOW SURGERY Right 2009    right radial head surgery     Social History     Tobacco Use   • Smoking status: Never Smoker   • Smokeless tobacco: Never Used   Vaping Use   • Vaping Use: Never used   Substance Use Topics   • Alcohol use: Yes     Comment: socially   • Drug use: Not on file     Family History   Problem Relation Age of Onset   • Cancer Maternal Grandmother    • Diabetes Maternal Grandmother    • Colon cancer Paternal Grandfather        Objective   Vitals:    07/07/21 0839   BP: 108/70   Pulse: (!) 58   Resp: 15   Weight: 95.6 kg (210 lb 12.8 oz)   Height: 1.702 m (5' 7\")    Body mass index is 33.02 " kg/m².  Physical Exam  Vitals reviewed.   Constitutional:       Appearance: Normal appearance. He is not toxic-appearing.   HENT:      Head: Normocephalic and atraumatic.      Right Ear: Tympanic membrane and ear canal normal.      Left Ear: Tympanic membrane and ear canal normal.      Nose: Nose normal. No congestion.      Mouth/Throat:      Mouth: Mucous membranes are moist.   Eyes:      Extraocular Movements: Extraocular movements intact.      Conjunctiva/sclera: Conjunctivae normal.      Pupils: Pupils are equal, round, and reactive to light.   Neck:      Vascular: No carotid bruit.   Cardiovascular:      Rate and Rhythm: Normal rate and regular rhythm.      Pulses: Normal pulses.      Heart sounds: No murmur heard.     Pulmonary:      Effort: Pulmonary effort is normal.      Breath sounds: Normal breath sounds. No wheezing or rhonchi.   Abdominal:      General: Abdomen is flat.      Palpations: Abdomen is soft.      Tenderness: There is no abdominal tenderness. There is no guarding or rebound.      Hernia: No hernia is present.   Musculoskeletal:         General: Normal range of motion.      Cervical back: Normal range of motion and neck supple.      Right lower leg: No edema.      Left lower leg: No edema.   Lymphadenopathy:      Cervical: No cervical adenopathy.   Skin:     General: Skin is warm.      Findings: No bruising, erythema, lesion or rash.   Neurological:      General: No focal deficit present.      Mental Status: He is alert and oriented to person, place, and time.   Psychiatric:         Mood and Affect: Mood normal.         Behavior: Behavior normal.         Assessment/Plan   Problem List Items Addressed This Visit        Nervous    Back pain     This is a little better with weight loss.  He continues to do stretching each morning.            Other    Routine general medical examination at a health care facility - [facility]     Labs were done and reviewed with the patient  Lipid panel much improved  this time.  He has lost about 30 pounds with watching his diet and adding more aerobic exercise with his lifting    Colonoscopy 6/17/21.   1 polyp 6 mm in descending colon.  Repeat in 5 years         BMI 33.0-33.9,adult     Patient did have a BMI in the 36 sebastian.  This did reduce to 33 range BMI improved to 33/. patient will be continuing aerobic exercise and moderation in diet         Low vitamin D level     Vit D level is now in normal range.  Continue multivitamin with vitamin D supplement as the your level is within the normal range         Colon cancer screening     Patient is up to date with colonoscopy which was done in June 2021.  Repeat in 3- 5 years.  Did have colon polyp         RESOLVED: Elevated ALT measurement     Resolved normal LFTs               Patient agrees and verbalizes understanding of medication and treatment plan discussed at today's visit.  Patient was instructed to call or follow-up if symptoms persist or worsen.    No notes on file    Dona Pascual,       This note was created with voice recognition software. Inadvertent dictation errors should be disregarded. Please contact my office for clarification.

## 2021-07-07 NOTE — ASSESSMENT & PLAN NOTE
Labs were done and reviewed with the patient  Lipid panel much improved this time.  He has lost about 30 pounds with watching his diet and adding more aerobic exercise with his lifting    Colonoscopy 6/17/21.   1 polyp 6 mm in descending colon.  Repeat in 5 years

## 2021-07-07 NOTE — ASSESSMENT & PLAN NOTE
Patient is up to date with colonoscopy which was done in June 2021.  Repeat in 3- 5 years.  Did have colon polyp

## 2022-06-07 ENCOUNTER — TELEPHONE (OUTPATIENT)
Dept: FAMILY MEDICINE | Facility: CLINIC | Age: 52
End: 2022-06-07
Payer: COMMERCIAL

## 2022-06-07 NOTE — TELEPHONE ENCOUNTER
Virgil also needs a physical before August because he is going with [place] on the  camping trip. Could he be fit in as well? Please advise.

## 2022-07-06 ENCOUNTER — TELEPHONE (OUTPATIENT)
Dept: FAMILY MEDICINE | Facility: CLINIC | Age: 52
End: 2022-07-06
Payer: COMMERCIAL

## 2022-07-06 DIAGNOSIS — Z00.00 ROUTINE GENERAL MEDICAL EXAMINATION AT A HEALTH CARE FACILITY: Primary | ICD-10-CM

## 2022-07-06 DIAGNOSIS — R79.89 LOW VITAMIN D LEVEL: ICD-10-CM

## 2022-07-06 DIAGNOSIS — R74.01 ELEVATED ALT MEASUREMENT: ICD-10-CM

## 2022-07-06 NOTE — TELEPHONE ENCOUNTER
Patient requesting fbw order to have done at [facility] prior to appointment - aware Dr Pascual away until next week

## 2022-07-17 LAB
ALBUMIN SERPL-MCNC: 4.5 G/DL (ref 3.6–5.1)
ALBUMIN/GLOB SERPL: 2.1 (CALC) (ref 1–2.5)
ALP SERPL-CCNC: 80 U/L (ref 35–144)
ALT SERPL-CCNC: 24 U/L (ref 9–46)
APPEARANCE UR: CLEAR
AST SERPL-CCNC: 27 U/L (ref 10–35)
BACTERIA #/AREA URNS HPF: NORMAL /HPF
BASOPHILS # BLD AUTO: 38 CELLS/UL (ref 0–200)
BASOPHILS NFR BLD AUTO: 0.8 %
BILIRUB SERPL-MCNC: 0.5 MG/DL (ref 0.2–1.2)
BILIRUB UR QL STRIP: NEGATIVE
BUN SERPL-MCNC: 20 MG/DL (ref 7–25)
BUN/CREAT SERPL: NORMAL (CALC) (ref 6–22)
CALCIUM SERPL-MCNC: 9.6 MG/DL (ref 8.6–10.3)
CHLORIDE SERPL-SCNC: 103 MMOL/L (ref 98–110)
CHOLEST SERPL-MCNC: 206 MG/DL
CHOLEST/HDLC SERPL: 5.7 (CALC)
CO2 SERPL-SCNC: 29 MMOL/L (ref 20–32)
COLOR UR: YELLOW
CREAT SERPL-MCNC: 1.17 MG/DL (ref 0.7–1.3)
EGFRCR SERPLBLD CKD-EPI 2021: 75 ML/MIN/1.73M2
EOSINOPHIL # BLD AUTO: 240 CELLS/UL (ref 15–500)
EOSINOPHIL NFR BLD AUTO: 5 %
ERYTHROCYTE [DISTWIDTH] IN BLOOD BY AUTOMATED COUNT: 12.3 % (ref 11–15)
GLOBULIN SER CALC-MCNC: 2.1 G/DL (CALC) (ref 1.9–3.7)
GLUCOSE SERPL-MCNC: 91 MG/DL (ref 65–99)
GLUCOSE UR QL STRIP: NEGATIVE
HBA1C MFR BLD: 5.3 % OF TOTAL HGB
HCT VFR BLD AUTO: 44 % (ref 38.5–50)
HDLC SERPL-MCNC: 36 MG/DL
HGB BLD-MCNC: 15.2 G/DL (ref 13.2–17.1)
HGB UR QL STRIP: NEGATIVE
HYALINE CASTS #/AREA URNS LPF: NORMAL /LPF
KETONES UR QL STRIP: NEGATIVE
LDLC SERPL CALC-MCNC: 136 MG/DL (CALC)
LEUKOCYTE ESTERASE UR QL STRIP: NEGATIVE
LYMPHOCYTES # BLD AUTO: 1685 CELLS/UL (ref 850–3900)
LYMPHOCYTES NFR BLD AUTO: 35.1 %
MCH RBC QN AUTO: 31.9 PG (ref 27–33)
MCHC RBC AUTO-ENTMCNC: 34.5 G/DL (ref 32–36)
MCV RBC AUTO: 92.2 FL (ref 80–100)
MONOCYTES # BLD AUTO: 619 CELLS/UL (ref 200–950)
MONOCYTES NFR BLD AUTO: 12.9 %
NEUTROPHILS # BLD AUTO: 2218 CELLS/UL (ref 1500–7800)
NEUTROPHILS NFR BLD AUTO: 46.2 %
NITRITE UR QL STRIP: NEGATIVE
NONHDLC SERPL-MCNC: 170 MG/DL (CALC)
PH UR STRIP: 5.5 [PH] (ref 5–8)
PLATELET # BLD AUTO: 239 THOUSAND/UL (ref 140–400)
PMV BLD REES-ECKER: 9 FL (ref 7.5–12.5)
POTASSIUM SERPL-SCNC: 4.6 MMOL/L (ref 3.5–5.3)
PROT SERPL-MCNC: 6.6 G/DL (ref 6.1–8.1)
PROT UR QL STRIP: NEGATIVE
PSA SERPL-MCNC: 0.45 NG/ML
RBC # BLD AUTO: 4.77 MILLION/UL (ref 4.2–5.8)
RBC #/AREA URNS HPF: NORMAL /HPF
SODIUM SERPL-SCNC: 137 MMOL/L (ref 135–146)
SP GR UR STRIP: 1.02 (ref 1–1.03)
SQUAMOUS #/AREA URNS HPF: NORMAL /HPF
TRIGL SERPL-MCNC: 195 MG/DL
TSH SERPL-ACNC: 3.01 MIU/L (ref 0.4–4.5)
WBC # BLD AUTO: 4.8 THOUSAND/UL (ref 3.8–10.8)
WBC #/AREA URNS HPF: NORMAL /HPF

## 2022-07-20 NOTE — RESULT ENCOUNTER NOTE
Virgil  Will review your labs at your upcoming OV. Everythings looks ok except mildly elevated cholesterol which is about the same as last year. Total cholesterol is 206 The LDL is 136

## 2022-07-25 ENCOUNTER — OFFICE VISIT (OUTPATIENT)
Dept: FAMILY MEDICINE | Facility: CLINIC | Age: 52
End: 2022-07-25
Payer: COMMERCIAL

## 2022-07-25 VITALS
WEIGHT: 217 LBS | TEMPERATURE: 97.2 F | HEIGHT: 67 IN | RESPIRATION RATE: 15 BRPM | DIASTOLIC BLOOD PRESSURE: 80 MMHG | HEART RATE: 80 BPM | BODY MASS INDEX: 34.06 KG/M2 | SYSTOLIC BLOOD PRESSURE: 118 MMHG

## 2022-07-25 DIAGNOSIS — R79.89 LOW VITAMIN D LEVEL: ICD-10-CM

## 2022-07-25 DIAGNOSIS — J30.9 ALLERGIC RHINITIS, UNSPECIFIED SEASONALITY, UNSPECIFIED TRIGGER: ICD-10-CM

## 2022-07-25 DIAGNOSIS — Z12.11 COLON CANCER SCREENING: ICD-10-CM

## 2022-07-25 DIAGNOSIS — M54.9 ACUTE LEFT-SIDED BACK PAIN, UNSPECIFIED BACK LOCATION: ICD-10-CM

## 2022-07-25 DIAGNOSIS — Z00.00 ROUTINE GENERAL MEDICAL EXAMINATION AT A HEALTH CARE FACILITY: Primary | ICD-10-CM

## 2022-07-25 DIAGNOSIS — E78.00 ELEVATED CHOLESTEROL: ICD-10-CM

## 2022-07-25 PROCEDURE — 99396 PREV VISIT EST AGE 40-64: CPT | Performed by: FAMILY MEDICINE

## 2022-07-25 PROCEDURE — 3008F BODY MASS INDEX DOCD: CPT | Performed by: FAMILY MEDICINE

## 2022-07-25 RX ORDER — IBUPROFEN 200 MG
400 TABLET ORAL DAILY
COMMUNITY
End: 2024-05-07 | Stop reason: ALTCHOICE

## 2022-07-25 ASSESSMENT — ENCOUNTER SYMPTOMS
CONSTITUTIONAL NEGATIVE: 1
NEUROLOGICAL NEGATIVE: 1
MUSCULOSKELETAL NEGATIVE: 1
CARDIOVASCULAR NEGATIVE: 1
RESPIRATORY NEGATIVE: 1
PSYCHIATRIC NEGATIVE: 1
EYES NEGATIVE: 1
ALLERGIC/IMMUNOLOGIC NEGATIVE: 1
GASTROINTESTINAL NEGATIVE: 1
HEMATOLOGIC/LYMPHATIC NEGATIVE: 1
ENDOCRINE NEGATIVE: 1

## 2022-07-25 NOTE — PROGRESS NOTES
Subjective      Patient ID: Virgil Mejia is a 52 y.o. male.    Pt is here for his physical for        Tobacco  Allergies  Meds  Problems  Med Hx  Surg Hx  Fam Hx         Review of Systems   Constitutional: Negative.    HENT: Negative.    Eyes: Negative.    Respiratory: Negative.    Cardiovascular: Negative.    Gastrointestinal: Negative.    Endocrine: Negative.    Genitourinary: Negative.    Musculoskeletal: Negative.    Skin: Negative.    Allergic/Immunologic: Negative.    Neurological: Negative.    Hematological: Negative.    Psychiatric/Behavioral: Negative.      Allergies   Allergen Reactions   • No Known Allergies      Current Outpatient Medications   Medication Sig Dispense Refill   • cholecalciferol, vitamin D3, 1,000 unit (25 mcg) tablet Take 1,000 Units by mouth daily.     • ibuprofen (MOTRIN) 200 mg tablet Take 400 mg by mouth daily.      • mecobalamin (B12 ACTIVE ORAL) Take 1 tablet by mouth daily.     • multivitamin capsule Take 1 capsule by mouth daily.     • loratadine 10 mg capsule Take 10 mg by mouth daily.       No current facility-administered medications for this visit.     Past Medical History:   Diagnosis Date   • Allergic rhinitis 8/2/2013    Overview:    • Back pain 8/2/2013    Overview:    • Colon polyp 06/17/2021    colon polyp Dr Tate     Past Surgical History:   Procedure Laterality Date   • COLONOSCOPY      Dr Tate   • ELBOW SURGERY Right 2009    right radial head surgery   • ULNAR NERVE TRANSPOSITION  2016    approx   • VASECTOMY       Social History     Tobacco Use   • Smoking status: Never Smoker   • Smokeless tobacco: Never Used   Vaping Use   • Vaping Use: Never used   Substance Use Topics   • Alcohol use: Yes     Comment: socially     Family History   Problem Relation Age of Onset   • Cancer Maternal Grandmother    • Diabetes Maternal Grandmother    • Colon cancer Paternal Grandfather        Objective   Vitals:    07/25/22 1428   BP: 118/80   Pulse: 80   Resp: 15   Temp:  "36.2 °C (97.2 °F)   Weight: 98.4 kg (217 lb)   Height: 1.702 m (5' 7\")    Body mass index is 33.99 kg/m².  Physical Exam  Vitals reviewed.   Constitutional:       Appearance: Normal appearance. He is not toxic-appearing.   HENT:      Head: Normocephalic and atraumatic.      Right Ear: Tympanic membrane and ear canal normal.      Left Ear: Tympanic membrane and ear canal normal.      Nose: Nose normal. No congestion.      Mouth/Throat:      Mouth: Mucous membranes are moist.   Eyes:      Extraocular Movements: Extraocular movements intact.      Conjunctiva/sclera: Conjunctivae normal.      Pupils: Pupils are equal, round, and reactive to light.   Neck:      Vascular: No carotid bruit.   Cardiovascular:      Rate and Rhythm: Normal rate and regular rhythm.      Pulses: Normal pulses.      Heart sounds: No murmur heard.  Pulmonary:      Effort: Pulmonary effort is normal.      Breath sounds: Normal breath sounds. No wheezing or rhonchi.   Abdominal:      General: Abdomen is flat.      Palpations: Abdomen is soft.      Tenderness: There is no abdominal tenderness. There is no guarding or rebound.      Hernia: No hernia is present.   Musculoskeletal:         General: Normal range of motion.      Cervical back: Normal range of motion and neck supple.      Right lower leg: No edema.      Left lower leg: No edema.   Lymphadenopathy:      Cervical: No cervical adenopathy.   Skin:     General: Skin is warm.      Findings: No bruising, erythema, lesion or rash.   Neurological:      General: No focal deficit present.      Mental Status: He is alert and oriented to person, place, and time.   Psychiatric:         Mood and Affect: Mood normal.         Behavior: Behavior normal.         Assessment/Plan   Problem List Items Addressed This Visit        Nervous    Back pain     Stretching  For back  He takes Advil prophylactic each am before working up               Respiratory    Allergic rhinitis    Relevant Medications    ibuprofen " (MOTRIN) 200 mg tablet       Endocrine/Metabolic    Elevated cholesterol    Relevant Orders    CT HEART CORONARY ARTERY CALCIUM SCORE WITHOUT IV CONTRAST       Other    Routine general medical examination at a health care facility - [facility]    BMI 33.0-33.9,adult    Low vitamin D level    Colon cancer screening     6/17/2021  Repeat in 5 years                 Patient agrees and verbalizes understanding of medication and treatment plan discussed at today's visit.  Patient was instructed to call or follow-up if symptoms persist or worsen.         Dona Pascual, DO      This note was created with voice recognition software. Inadvertent dictation errors should be disregarded. Please contact my office for clarification.

## 2022-12-05 ENCOUNTER — TELEPHONE (OUTPATIENT)
Dept: FAMILY MEDICINE | Facility: CLINIC | Age: 52
End: 2022-12-05
Payer: COMMERCIAL

## 2022-12-05 RX ORDER — CYCLOBENZAPRINE HCL 10 MG
10 TABLET ORAL 3 TIMES DAILY PRN
Qty: 21 TABLET | Refills: 0 | Status: SHIPPED | OUTPATIENT
Start: 2022-12-05 | End: 2023-08-24 | Stop reason: ALTCHOICE

## 2022-12-05 NOTE — TELEPHONE ENCOUNTER
Let patient know I sent the Flexeril to the pharmacy for him.  If he is not getting better in a reasonable time with this and ibuprofen he should let me know or schedule an appointment

## 2022-12-05 NOTE — TELEPHONE ENCOUNTER
Patient is requesting flexeril for his back pain. It started on Friday and Advil is not helping. He states that you have given him this in the past.

## 2023-08-07 ENCOUNTER — TELEPHONE (OUTPATIENT)
Dept: FAMILY MEDICINE | Facility: CLINIC | Age: 53
End: 2023-08-07
Payer: COMMERCIAL

## 2023-08-07 DIAGNOSIS — Z00.00 ROUTINE GENERAL MEDICAL EXAMINATION AT A HEALTH CARE FACILITY: ICD-10-CM

## 2023-08-07 DIAGNOSIS — R74.01 ELEVATED ALT MEASUREMENT: ICD-10-CM

## 2023-08-07 DIAGNOSIS — E78.00 ELEVATED CHOLESTEROL: ICD-10-CM

## 2023-08-07 DIAGNOSIS — R79.89 LOW VITAMIN D LEVEL: Primary | ICD-10-CM

## 2023-08-20 LAB
25(OH)D3 SERPL-MCNC: 36 NG/ML (ref 30–100)
ALBUMIN SERPL-MCNC: 4.1 G/DL (ref 3.6–5.1)
ALBUMIN/GLOB SERPL: 1.6 (CALC) (ref 1–2.5)
ALP SERPL-CCNC: 71 U/L (ref 35–144)
ALT SERPL-CCNC: 22 U/L (ref 9–46)
APPEARANCE UR: CLEAR
AST SERPL-CCNC: 23 U/L (ref 10–35)
BACTERIA #/AREA URNS HPF: NORMAL /HPF
BASOPHILS # BLD AUTO: 39 CELLS/UL (ref 0–200)
BASOPHILS NFR BLD AUTO: 0.7 %
BILIRUB SERPL-MCNC: 0.6 MG/DL (ref 0.2–1.2)
BILIRUB UR QL STRIP: NEGATIVE
BUN SERPL-MCNC: 18 MG/DL (ref 7–25)
BUN/CREAT SERPL: NORMAL (CALC) (ref 6–22)
CALCIUM SERPL-MCNC: 9.4 MG/DL (ref 8.6–10.3)
CHLORIDE SERPL-SCNC: 104 MMOL/L (ref 98–110)
CHOLEST SERPL-MCNC: 215 MG/DL
CHOLEST/HDLC SERPL: 6.3 (CALC)
CO2 SERPL-SCNC: 29 MMOL/L (ref 20–32)
COLOR UR: YELLOW
CREAT SERPL-MCNC: 1.24 MG/DL (ref 0.7–1.3)
EGFRCR SERPLBLD CKD-EPI 2021: 69 ML/MIN/1.73M2
EOSINOPHIL # BLD AUTO: 308 CELLS/UL (ref 15–500)
EOSINOPHIL NFR BLD AUTO: 5.5 %
ERYTHROCYTE [DISTWIDTH] IN BLOOD BY AUTOMATED COUNT: 12.4 % (ref 11–15)
GLOBULIN SER CALC-MCNC: 2.5 G/DL (CALC) (ref 1.9–3.7)
GLUCOSE SERPL-MCNC: 98 MG/DL (ref 65–99)
GLUCOSE UR QL STRIP: NEGATIVE
HBA1C MFR BLD: 5.2 % OF TOTAL HGB
HCT VFR BLD AUTO: 42.1 % (ref 38.5–50)
HDLC SERPL-MCNC: 34 MG/DL
HGB BLD-MCNC: 14.5 G/DL (ref 13.2–17.1)
HGB UR QL STRIP: NEGATIVE
HYALINE CASTS #/AREA URNS LPF: NORMAL /LPF
KETONES UR QL STRIP: NEGATIVE
LDLC SERPL CALC-MCNC: 146 MG/DL (CALC)
LEUKOCYTE ESTERASE UR QL STRIP: NEGATIVE
LYMPHOCYTES # BLD AUTO: 1921 CELLS/UL (ref 850–3900)
LYMPHOCYTES NFR BLD AUTO: 34.3 %
MCH RBC QN AUTO: 31.7 PG (ref 27–33)
MCHC RBC AUTO-ENTMCNC: 34.4 G/DL (ref 32–36)
MCV RBC AUTO: 92.1 FL (ref 80–100)
MONOCYTES # BLD AUTO: 678 CELLS/UL (ref 200–950)
MONOCYTES NFR BLD AUTO: 12.1 %
NEUTROPHILS # BLD AUTO: 2654 CELLS/UL (ref 1500–7800)
NEUTROPHILS NFR BLD AUTO: 47.4 %
NITRITE UR QL STRIP: NEGATIVE
NONHDLC SERPL-MCNC: 181 MG/DL (CALC)
PH UR STRIP: 5.5 [PH] (ref 5–8)
PLATELET # BLD AUTO: 243 THOUSAND/UL (ref 140–400)
PMV BLD REES-ECKER: 9.3 FL (ref 7.5–12.5)
POTASSIUM SERPL-SCNC: 4.2 MMOL/L (ref 3.5–5.3)
PROT SERPL-MCNC: 6.6 G/DL (ref 6.1–8.1)
PROT UR QL STRIP: NEGATIVE
PSA SERPL-MCNC: 0.53 NG/ML
RBC # BLD AUTO: 4.57 MILLION/UL (ref 4.2–5.8)
RBC #/AREA URNS HPF: NORMAL /HPF
SERVICE CMNT-IMP: NORMAL
SODIUM SERPL-SCNC: 138 MMOL/L (ref 135–146)
SP GR UR STRIP: 1.02 (ref 1–1.03)
SQUAMOUS #/AREA URNS HPF: NORMAL /HPF
TRIGL SERPL-MCNC: 211 MG/DL
TSH SERPL-ACNC: 2.63 MIU/L (ref 0.4–4.5)
VIT B12 SERPL-MCNC: 449 PG/ML (ref 200–1100)
WBC # BLD AUTO: 5.6 THOUSAND/UL (ref 3.8–10.8)
WBC #/AREA URNS HPF: NORMAL /HPF

## 2023-08-20 NOTE — RESULT ENCOUNTER NOTE
Will review at upcoming OV.   Everything ok except for elevated total cholesterol and LDL.  The HDL is 24 ,   discuss if want to get the CT calcium score at the upcoming OV

## 2023-08-24 ENCOUNTER — OFFICE VISIT (OUTPATIENT)
Dept: FAMILY MEDICINE | Facility: CLINIC | Age: 53
End: 2023-08-24
Payer: COMMERCIAL

## 2023-08-24 VITALS
TEMPERATURE: 97.9 F | BODY MASS INDEX: 35.06 KG/M2 | DIASTOLIC BLOOD PRESSURE: 80 MMHG | RESPIRATION RATE: 16 BRPM | HEIGHT: 67 IN | WEIGHT: 223.4 LBS | SYSTOLIC BLOOD PRESSURE: 118 MMHG | HEART RATE: 69 BPM | OXYGEN SATURATION: 97 %

## 2023-08-24 DIAGNOSIS — Z00.00 ROUTINE GENERAL MEDICAL EXAMINATION AT A HEALTH CARE FACILITY: Primary | ICD-10-CM

## 2023-08-24 DIAGNOSIS — M54.9 ACUTE LEFT-SIDED BACK PAIN, UNSPECIFIED BACK LOCATION: ICD-10-CM

## 2023-08-24 DIAGNOSIS — R06.83 SNORING: ICD-10-CM

## 2023-08-24 DIAGNOSIS — Z12.11 COLON CANCER SCREENING: ICD-10-CM

## 2023-08-24 DIAGNOSIS — E78.00 ELEVATED CHOLESTEROL: ICD-10-CM

## 2023-08-24 DIAGNOSIS — R79.89 LOW VITAMIN D LEVEL: ICD-10-CM

## 2023-08-24 PROCEDURE — 3008F BODY MASS INDEX DOCD: CPT | Performed by: FAMILY MEDICINE

## 2023-08-24 PROCEDURE — 99396 PREV VISIT EST AGE 40-64: CPT | Performed by: FAMILY MEDICINE

## 2023-08-24 RX ORDER — IPRATROPIUM BROMIDE 21 UG/1
2 SPRAY, METERED NASAL
Qty: 30 ML | Refills: 3 | Status: SHIPPED | OUTPATIENT
Start: 2023-08-24 | End: 2024-04-24 | Stop reason: SDUPTHER

## 2023-08-24 ASSESSMENT — PATIENT HEALTH QUESTIONNAIRE - PHQ9: SUM OF ALL RESPONSES TO PHQ9 QUESTIONS 1 & 2: 0

## 2023-08-24 ASSESSMENT — ENCOUNTER SYMPTOMS
MUSCULOSKELETAL NEGATIVE: 1
PSYCHIATRIC NEGATIVE: 1
ENDOCRINE NEGATIVE: 1
CARDIOVASCULAR NEGATIVE: 1
EYES NEGATIVE: 1
CONSTITUTIONAL NEGATIVE: 1
HEMATOLOGIC/LYMPHATIC NEGATIVE: 1
ALLERGIC/IMMUNOLOGIC NEGATIVE: 1
GASTROINTESTINAL NEGATIVE: 1
NEUROLOGICAL NEGATIVE: 1
RESPIRATORY NEGATIVE: 1

## 2023-08-24 NOTE — PROGRESS NOTES
Subjective      Patient ID: Virgil Mejia is a 53 y.o. male.    Pt is here for his physical.   Labs reviewed  Works as a .  In the middle of PT for the back pain .  he works out regularly.  Denies specific c/o. No cp or sob with exercise.  Does snore       Tobacco  Allergies  Meds  Problems  Surg Hx  Fam Hx  Soc Hx      Review of Systems   Constitutional: Negative.    HENT: Negative.    Eyes: Negative.    Respiratory: Negative.         Snoring   Cardiovascular: Negative.    Gastrointestinal: Negative.    Endocrine: Negative.    Genitourinary: Negative.    Musculoskeletal: Negative.    Skin: Negative.    Allergic/Immunologic: Negative.    Neurological: Negative.    Hematological: Negative.    Psychiatric/Behavioral: Negative.      Allergies   Allergen Reactions   • No Known Allergies      Current Outpatient Medications   Medication Sig Dispense Refill   • cholecalciferol, vitamin D3, 1,000 unit (25 mcg) tablet Take 1,000 Units by mouth daily.     • ibuprofen (MOTRIN) 200 mg tablet Take 400 mg by mouth daily.      • ipratropium (ATROVENT) 21 mcg (0.03 %) nasal spray Administer 2 sprays into each nostril every 12 (twelve) hours. 30 mL 3   • loratadine 10 mg capsule Take 10 mg by mouth daily.     • mecobalamin (B12 ACTIVE ORAL) Take 1 tablet by mouth daily.     • multivitamin capsule Take 1 capsule by mouth daily.       No current facility-administered medications for this visit.     Past Medical History:   Diagnosis Date   • Allergic rhinitis 8/2/2013    Overview:    • Back pain 8/2/2013    Overview:    • Colon polyp 06/17/2021    colon polyp Dr Taet   • Elevated cholesterol 7/25/2022   • H/O tinnitus 7/17/2014    Overview:      Past Surgical History:   Procedure Laterality Date   • COLONOSCOPY  2021    Dr Tate   • ELBOW SURGERY Right 2009    right radial head surgery   • ULNAR NERVE TRANSPOSITION  2016    approx   • VASECTOMY       Social History     Tobacco Use   • Smoking status: Never   •  "Smokeless tobacco: Never   Vaping Use   • Vaping Use: Never used   Substance Use Topics   • Alcohol use: Yes     Comment: socially     Family History   Problem Relation Age of Onset   • Hyperlipidemia Biological Father    • Heart disease Biological Father    • Alcohol abuse Biological Sister    • Diabetes Biological Brother         possible pre DM   • Hyperlipidemia Biological Brother    • Thyroid disease Biological Brother    • Cancer Maternal Grandmother    • Diabetes Maternal Grandmother    • Cancer Paternal Grandmother 40        brain tumor   • Colon cancer Paternal Grandfather        Objective   Vitals:    08/24/23 1303   BP: 118/80   Pulse: 69   Resp: 16   Temp: 36.6 °C (97.9 °F)   SpO2: 97%   Weight: 101 kg (223 lb 6.4 oz)   Height: 1.702 m (5' 7\")    Body mass index is 34.99 kg/m².  Physical Exam  Vitals reviewed.   Constitutional:       Appearance: Normal appearance. He is obese. He is not toxic-appearing.   HENT:      Head: Normocephalic and atraumatic.      Right Ear: Tympanic membrane and ear canal normal.      Left Ear: Tympanic membrane and ear canal normal.      Nose: Nose normal. No congestion.      Mouth/Throat:      Mouth: Mucous membranes are moist.   Eyes:      Extraocular Movements: Extraocular movements intact.      Conjunctiva/sclera: Conjunctivae normal.      Pupils: Pupils are equal, round, and reactive to light.   Neck:      Vascular: No carotid bruit.   Cardiovascular:      Rate and Rhythm: Normal rate and regular rhythm.      Pulses: Normal pulses.      Heart sounds: No murmur heard.  Pulmonary:      Effort: Pulmonary effort is normal.      Breath sounds: Normal breath sounds. No wheezing or rhonchi.   Abdominal:      General: Abdomen is flat.      Palpations: Abdomen is soft.      Tenderness: There is no abdominal tenderness. There is no guarding or rebound.      Hernia: No hernia is present.   Musculoskeletal:         General: Normal range of motion.      Cervical back: Normal range of " motion and neck supple.      Right lower leg: No edema.      Left lower leg: No edema.   Lymphadenopathy:      Cervical: No cervical adenopathy.   Skin:     General: Skin is warm.      Findings: No bruising, erythema, lesion or rash.   Neurological:      General: No focal deficit present.      Mental Status: He is alert and oriented to person, place, and time.   Psychiatric:         Mood and Affect: Mood normal.         Behavior: Behavior normal.         Assessment/Plan   Diagnoses and all orders for this visit:    Routine general medical examination at a health care facility ([facility])  Assessment & Plan:  Routine labs reviewed  CT calcium score   Tdap up to date  Colon up to date      Elevated cholesterol  Assessment & Plan:  Lab Results   Component Value Date    CHOL 215 (H) 08/19/2023    CHOL 206 (H) 07/16/2022    CHOL 198 07/01/2021     Lab Results   Component Value Date    HDL 34 (L) 08/19/2023    HDL 36 (L) 07/16/2022    HDL 44 07/01/2021     Lab Results   Component Value Date    LDLCALC 146 (H) 08/19/2023    LDLCALC 136 (H) 07/16/2022    LDLCALC 137 (H) 07/01/2021     Lab Results   Component Value Date    TRIG 211 (H) 08/19/2023    TRIG 195 (H) 07/16/2022    TRIG 73 07/01/2021     No meds  Check baseline CT calcium score  Dad with heart dz but not until his 80s    Orders:  -     CT HEART CORONARY ARTERY CALCIUM SCORE WITHOUT IV CONTRAST; Future    Colon cancer screening  Assessment & Plan:  Up to date . Due in 2026      Low vitamin D level  Assessment & Plan:  Level is now in normal range  Stay at same level  Of D3       Acute left-sided back pain, unspecified back location  Assessment & Plan:  Stretching       BMI 34.0-34.9,adult  Assessment & Plan:  Diet and exercise       Snoring  Assessment & Plan:  Recommend sleep study      Other orders  -     ipratropium (ATROVENT) 21 mcg (0.03 %) nasal spray; Administer 2 sprays into each nostril every 12 (twelve) hours.      Patient agrees and verbalizes  understanding of medication and treatment plan discussed at today's visit.  Patient was instructed to call or follow-up if symptoms persist or worsen.         Dona Pascual, DO      This note was created with voice recognition software. Inadvertent dictation errors should be disregarded. Please contact my office for clarification.

## 2023-08-24 NOTE — ASSESSMENT & PLAN NOTE
Lab Results   Component Value Date    CHOL 215 (H) 08/19/2023    CHOL 206 (H) 07/16/2022    CHOL 198 07/01/2021     Lab Results   Component Value Date    HDL 34 (L) 08/19/2023    HDL 36 (L) 07/16/2022    HDL 44 07/01/2021     Lab Results   Component Value Date    LDLCALC 146 (H) 08/19/2023    LDLCALC 136 (H) 07/16/2022    LDLCALC 137 (H) 07/01/2021     Lab Results   Component Value Date    TRIG 211 (H) 08/19/2023    TRIG 195 (H) 07/16/2022    TRIG 73 07/01/2021     No meds  Check baseline CT calcium score  Dad with heart dz but not until his 80s

## 2023-08-30 ENCOUNTER — HOSPITAL ENCOUNTER (OUTPATIENT)
Dept: RADIOLOGY | Age: 53
Discharge: HOME | End: 2023-08-30
Attending: FAMILY MEDICINE

## 2023-08-30 DIAGNOSIS — E78.00 ELEVATED CHOLESTEROL: ICD-10-CM

## 2023-08-30 PROCEDURE — 75571 CT HRT W/O DYE W/CA TEST: CPT | Mod: MG

## 2023-08-30 PROCEDURE — G1004 CDSM NDSC: HCPCS

## 2023-09-02 ENCOUNTER — TELEPHONE (OUTPATIENT)
Dept: FAMILY MEDICINE | Facility: CLINIC | Age: 53
End: 2023-09-02
Payer: COMMERCIAL

## 2023-09-02 DIAGNOSIS — R93.89 ABNORMAL FINDING ON CT SCAN: Primary | ICD-10-CM

## 2023-09-02 NOTE — TELEPHONE ENCOUNTER
Please make sure pt gets message  Virgil  The CT scan is 0 which is good.  There are a few incidental findings on the lung fields that I would recommend seeing a lung specialist for .  I will put in a referral to see the pulmonary specialist to have him review the films and evaluate.  Recommend Dr Zeus Curtis or Dr Yelena Tong in [place].  [phone]

## 2023-09-06 ENCOUNTER — TELEPHONE (OUTPATIENT)
Dept: FAMILY MEDICINE | Facility: CLINIC | Age: 53
End: 2023-09-06
Payer: COMMERCIAL

## 2023-09-07 NOTE — TELEPHONE ENCOUNTER
Patient is returning your call. Did not receive the message that was relayed. Pt can be reached @ [phone].

## 2023-11-15 ENCOUNTER — OFFICE VISIT (OUTPATIENT)
Dept: FAMILY MEDICINE | Facility: CLINIC | Age: 53
End: 2023-11-15
Payer: COMMERCIAL

## 2023-11-15 VITALS
HEIGHT: 67 IN | OXYGEN SATURATION: 97 % | WEIGHT: 213 LBS | RESPIRATION RATE: 16 BRPM | BODY MASS INDEX: 33.43 KG/M2 | HEART RATE: 88 BPM | DIASTOLIC BLOOD PRESSURE: 84 MMHG | SYSTOLIC BLOOD PRESSURE: 128 MMHG

## 2023-11-15 DIAGNOSIS — J01.90 ACUTE NON-RECURRENT SINUSITIS, UNSPECIFIED LOCATION: Primary | ICD-10-CM

## 2023-11-15 DIAGNOSIS — U07.1 COVID-19: ICD-10-CM

## 2023-11-15 PROCEDURE — 99213 OFFICE O/P EST LOW 20 MIN: CPT | Performed by: FAMILY MEDICINE

## 2023-11-15 PROCEDURE — 3008F BODY MASS INDEX DOCD: CPT | Performed by: FAMILY MEDICINE

## 2023-11-15 RX ORDER — CEFUROXIME AXETIL 500 MG/1
500 TABLET ORAL 2 TIMES DAILY
Qty: 20 TABLET | Refills: 0 | Status: SHIPPED | OUTPATIENT
Start: 2023-11-15 | End: 2023-11-25

## 2023-11-15 NOTE — PROGRESS NOTES
Subjective      Patient ID: Virgil Mejia is a 53 y.o. male.    HPI patient is here for continuing symptoms status post COVID.  He developed COVID symptoms first on October 24.,  Cough fever fatigue.  Fever has stopped.  He continues to have a dry cough slight shortness of breath, some dizziness, mild headaches, sinus pressure and congestion and drainage as well as altered taste     Tobacco  Allergies  Meds  Problems  Med Hx  Surg Hx  Fam Hx       Review of Systems  Allergies   Allergen Reactions    No Known Allergies      Current Outpatient Medications   Medication Sig Dispense Refill    cefuroxime (CEFTIN) 500 mg tablet Take 1 tablet (500 mg total) by mouth 2 (two) times a day for 10 days. 20 tablet 0    cholecalciferol, vitamin D3, 1,000 unit (25 mcg) tablet Take 1,000 Units by mouth daily.      ibuprofen (MOTRIN) 200 mg tablet Take 400 mg by mouth daily.       ipratropium (ATROVENT) 21 mcg (0.03 %) nasal spray Administer 2 sprays into each nostril every 12 (twelve) hours. 30 mL 3    loratadine 10 mg capsule Take 10 mg by mouth daily.      mecobalamin (B12 ACTIVE ORAL) Take 1 tablet by mouth daily.      multivitamin capsule Take 1 capsule by mouth daily.       No current facility-administered medications for this visit.     Past Medical History:   Diagnosis Date    Allergic rhinitis 8/2/2013    Overview:     Back pain 8/2/2013    Overview:     Colon polyp 06/17/2021    colon polyp Dr Tate    Elevated cholesterol 7/25/2022    H/O tinnitus 7/17/2014    Overview:      Past Surgical History:   Procedure Laterality Date    COLONOSCOPY  2021    Dr Tate    ELBOW SURGERY Right 2009    right radial head surgery    ULNAR NERVE TRANSPOSITION  2016    approx    VASECTOMY       Social History     Tobacco Use    Smoking status: Never    Smokeless tobacco: Never   Vaping Use    Vaping Use: Never used   Substance Use Topics    Alcohol use: Yes     Comment: socially     Family History   Problem  "Relation Age of Onset    Hyperlipidemia Biological Father     Heart disease Biological Father     Alcohol abuse Biological Sister     Diabetes Biological Brother         possible pre DM    Hyperlipidemia Biological Brother     Thyroid disease Biological Brother     Cancer Maternal Grandmother     Diabetes Maternal Grandmother     Cancer Paternal Grandmother 40        brain tumor    Colon cancer Paternal Grandfather        Objective   Vitals:    11/15/23 1418   BP: 128/84   Pulse: 88   Resp: 16   SpO2: 97%   Weight: 96.6 kg (213 lb)   Height: 1.702 m (5' 7\")    Body mass index is 33.36 kg/m².  Physical Exam  Constitutional:       General: He is not in acute distress.     Appearance: He is well-developed. He is not toxic-appearing.   HENT:      Head: Normocephalic.      Right Ear: Tympanic membrane, ear canal and external ear normal.      Left Ear: Tympanic membrane, ear canal and external ear normal.      Nose: Nose normal.      Mouth/Throat:      Mouth: Mucous membranes are moist.      Pharynx: No oropharyngeal exudate or posterior oropharyngeal erythema.   Eyes:      General: Lids are normal.      Conjunctiva/sclera: Conjunctivae normal.   Neck:      Thyroid: No thyroid mass or thyromegaly.   Cardiovascular:      Rate and Rhythm: Normal rate and regular rhythm.      Heart sounds: Normal heart sounds. No murmur heard.  Pulmonary:      Effort: Pulmonary effort is normal. No respiratory distress.      Breath sounds: Normal breath sounds.   Musculoskeletal:      Cervical back: Neck supple.   Lymphadenopathy:      Head:      Right side of head: No submental, submandibular, preauricular, posterior auricular or occipital adenopathy.      Left side of head: No submental, submandibular, preauricular, posterior auricular or occipital adenopathy.      Cervical: No cervical adenopathy.   Psychiatric:      Comments: Alert and oriented         Assessment/Plan   Problem List Items Addressed This Visit    None  Visit " Diagnoses     Acute non-recurrent sinusitis, unspecified location    -  Primary    COVID-19          The majority of his symptoms are COVID/post-COVID related.  His exam is normal so I would expect most of the symptoms to resolve on their own however the patient does have continuing sinus drainage and pressure so treat for sinusitis with Ceftin twice daily for 10 days.    Patient agrees and verbalizes understanding of medication and treatment plan discussed at today's visit.  Patient was instructed to call or follow-up if symptoms persist or worsen.         Davide Dacosta, DO    This note was created with voice recognition software. Inadvertent dictation errors should be disregarded. Please contact my office for clarification.

## 2024-02-26 ENCOUNTER — TRANSCRIBE ORDERS (OUTPATIENT)
Dept: SCHEDULING | Age: 54
End: 2024-02-26

## 2024-02-26 DIAGNOSIS — R93.89 ABNORMAL FINDINGS ON DIAGNOSTIC IMAGING OF OTHER SPECIFIED BODY STRUCTURES: Primary | ICD-10-CM

## 2024-04-12 ENCOUNTER — HOSPITAL ENCOUNTER (OUTPATIENT)
Dept: RADIOLOGY | Age: 54
Discharge: HOME | End: 2024-04-12
Attending: NURSE PRACTITIONER
Payer: COMMERCIAL

## 2024-04-12 DIAGNOSIS — R93.89 ABNORMAL FINDINGS ON DIAGNOSTIC IMAGING OF OTHER SPECIFIED BODY STRUCTURES: ICD-10-CM

## 2024-04-12 PROCEDURE — 71250 CT THORAX DX C-: CPT

## 2024-04-24 RX ORDER — IPRATROPIUM BROMIDE 21 UG/1
2 SPRAY, METERED NASAL
Qty: 30 ML | Refills: 3 | Status: SHIPPED | OUTPATIENT
Start: 2024-04-24 | End: 2025-04-24

## 2024-04-24 NOTE — TELEPHONE ENCOUNTER
Medicine Refill Request    Last Office Visit: 11/15/2023   Last Consult Visit: Visit date not found  Last Telemedicine Visit: Visit date not found    Next Appointment: 7/22/2024      Current Outpatient Medications:     cholecalciferol, vitamin D3, 1,000 unit (25 mcg) tablet, Take 1,000 Units by mouth daily., Disp: , Rfl:     ibuprofen (MOTRIN) 200 mg tablet, Take 400 mg by mouth daily. , Disp: , Rfl:     ipratropium (ATROVENT) 21 mcg (0.03 %) nasal spray, Administer 2 sprays into each nostril every 12 (twelve) hours., Disp: 30 mL, Rfl: 3    loratadine 10 mg capsule, Take 10 mg by mouth daily., Disp: , Rfl:     mecobalamin (B12 ACTIVE ORAL), Take 1 tablet by mouth daily., Disp: , Rfl:     multivitamin capsule, Take 1 capsule by mouth daily., Disp: , Rfl:       BP Readings from Last 3 Encounters:   11/15/23 128/84   08/24/23 118/80   07/25/22 118/80       Recent Lab results:  Lab Results   Component Value Date    CHOL 215 (H) 08/19/2023   ,   Lab Results   Component Value Date    HDL 34 (L) 08/19/2023   ,   Lab Results   Component Value Date    LDLCALC 146 (H) 08/19/2023   ,   Lab Results   Component Value Date    TRIG 211 (H) 08/19/2023        Lab Results   Component Value Date    GLUCOSE 98 08/19/2023    GLUCOSE NEGATIVE 08/19/2023   ,   Lab Results   Component Value Date    HGBA1C 5.2 08/19/2023         Lab Results   Component Value Date    CREATININE 1.24 08/19/2023       Lab Results   Component Value Date    TSH 2.63 08/19/2023           Lab Results   Component Value Date    HGBA1C 5.2 08/19/2023

## 2024-05-06 ENCOUNTER — TELEPHONE (OUTPATIENT)
Dept: FAMILY MEDICINE | Facility: CLINIC | Age: 54
End: 2024-05-06
Payer: COMMERCIAL

## 2024-05-06 NOTE — TELEPHONE ENCOUNTER
Pt called earlier pt spoke with vipul. Pt called again pt waiting for an update pt is willing to come in if needed. Pt having back pain that shoots down the leg x three weeks getting worse. Per pt he's been taking Advil with no relief.

## 2024-05-06 NOTE — TELEPHONE ENCOUNTER
Spoke with conrad. Back started 3 weeks ago. Pain is at sacrum and goes up about 3-4 vertebrae. Pain is worse this week. Patient is requesting muscle relaxer and pain medication. Offered to schedule a visit. Patient declined.  Patient stated that the last time he did not need to be seen and medication was sent in

## 2024-05-07 RX ORDER — CYCLOBENZAPRINE HCL 10 MG
10 TABLET ORAL 3 TIMES DAILY PRN
Qty: 30 TABLET | Refills: 0 | Status: SHIPPED | OUTPATIENT
Start: 2024-05-07 | End: 2024-07-22 | Stop reason: ALTCHOICE

## 2024-05-07 RX ORDER — MELOXICAM 7.5 MG/1
7.5 TABLET ORAL DAILY
Qty: 30 TABLET | Refills: 0 | Status: SHIPPED | OUTPATIENT
Start: 2024-05-07 | End: 2024-10-22

## 2024-05-07 NOTE — TELEPHONE ENCOUNTER
Spoke with patient about has had similar pain before.  Patient works out a lot.  Does not have this signs of a disc pain with raising his leg when laying flat.  Will send in meloxicam and Flexeril.  If not improving patient will call for a same-day sick appointment.  Patient aware I am out of the office on Friday and if he were to need something to come in to call to be seen for a same-day sick appointment or next week.  If he has pain down his leg or weakness consistent with disc pain he should call

## 2024-05-07 NOTE — TELEPHONE ENCOUNTER
See other message. Sent in meloxicam and flexeril and offered to come in for SDS if gets progressive neuro sx

## 2024-07-22 ENCOUNTER — OFFICE VISIT (OUTPATIENT)
Dept: FAMILY MEDICINE | Facility: CLINIC | Age: 54
End: 2024-07-22
Payer: COMMERCIAL

## 2024-07-22 VITALS
BODY MASS INDEX: 34.78 KG/M2 | TEMPERATURE: 98.1 F | RESPIRATION RATE: 16 BRPM | WEIGHT: 221.6 LBS | SYSTOLIC BLOOD PRESSURE: 122 MMHG | HEART RATE: 79 BPM | HEIGHT: 67 IN | DIASTOLIC BLOOD PRESSURE: 70 MMHG | OXYGEN SATURATION: 97 %

## 2024-07-22 DIAGNOSIS — E78.00 ELEVATED CHOLESTEROL: ICD-10-CM

## 2024-07-22 DIAGNOSIS — J30.9 ALLERGIC RHINITIS, UNSPECIFIED SEASONALITY, UNSPECIFIED TRIGGER: ICD-10-CM

## 2024-07-22 DIAGNOSIS — Z12.11 COLON CANCER SCREENING: ICD-10-CM

## 2024-07-22 DIAGNOSIS — Z00.00 ROUTINE GENERAL MEDICAL EXAMINATION AT A HEALTH CARE FACILITY: Primary | ICD-10-CM

## 2024-07-22 DIAGNOSIS — M54.9 ACUTE LEFT-SIDED BACK PAIN, UNSPECIFIED BACK LOCATION: ICD-10-CM

## 2024-07-22 DIAGNOSIS — R06.83 SNORING: ICD-10-CM

## 2024-07-22 DIAGNOSIS — R91.8 ABNORMAL CT LUNG SCREENING: ICD-10-CM

## 2024-07-22 DIAGNOSIS — Z86.69 H/O TINNITUS: ICD-10-CM

## 2024-07-22 PROBLEM — R79.89 LOW VITAMIN D LEVEL: Status: RESOLVED | Noted: 2020-06-16 | Resolved: 2024-07-22

## 2024-07-22 PROCEDURE — 3008F BODY MASS INDEX DOCD: CPT | Performed by: FAMILY MEDICINE

## 2024-07-22 PROCEDURE — 99396 PREV VISIT EST AGE 40-64: CPT | Performed by: FAMILY MEDICINE

## 2024-07-22 PROCEDURE — 36415 COLL VENOUS BLD VENIPUNCTURE: CPT | Performed by: FAMILY MEDICINE

## 2024-07-22 ASSESSMENT — ENCOUNTER SYMPTOMS
GASTROINTESTINAL NEGATIVE: 1
CARDIOVASCULAR NEGATIVE: 1
ENDOCRINE NEGATIVE: 1
PSYCHIATRIC NEGATIVE: 1
EYES NEGATIVE: 1
NEUROLOGICAL NEGATIVE: 1
RESPIRATORY NEGATIVE: 1
CONSTITUTIONAL NEGATIVE: 1
HEMATOLOGIC/LYMPHATIC NEGATIVE: 1
ALLERGIC/IMMUNOLOGIC NEGATIVE: 1
MUSCULOSKELETAL NEGATIVE: 1

## 2024-07-22 NOTE — ASSESSMENT & PLAN NOTE
Recommend sleep study.  Asked patient to discuss with Dr. Curtis at his next follow-up for his abnormal CT scan of the lungs

## 2024-07-22 NOTE — PROGRESS NOTES
Subjective      Patient ID: Virgil Mejia is a 54 y.o. male.    54 year here for physical. Hx reviewed, feels good.  Patient had some left-sided low back pain about 2 months ago.  He is seeing the chiropractor.  It appears to be in the SI joint area.  Has taken some time out from working out.  Did not feel like the Flexeril helped much.  Patient had a CT calcium score last year which was 0.  When we did the CT calcium score we found some lung findings.  He was referred to the pulmonary specialist.  He saw Dr. Curtis.  He will need 6 months follow-up of CT and PFTs         Tobacco  Allergies  Meds  Problems  Med Hx  Surg Hx  Fam Hx       Review of Systems   Constitutional: Negative.    HENT: Negative.     Eyes: Negative.    Respiratory: Negative.     Cardiovascular: Negative.    Gastrointestinal: Negative.    Endocrine: Negative.    Genitourinary: Negative.    Musculoskeletal: Negative.    Skin: Negative.    Allergic/Immunologic: Negative.    Neurological: Negative.    Hematological: Negative.    Psychiatric/Behavioral: Negative.       Allergies   Allergen Reactions    No Known Allergies      Current Outpatient Medications   Medication Sig Dispense Refill    cholecalciferol, vitamin D3, 1,000 unit (25 mcg) tablet Take 1,000 Units by mouth daily.      ipratropium (ATROVENT) 21 mcg (0.03 %) nasal spray Administer 2 sprays into each nostril every 12 (twelve) hours. 30 mL 3    mecobalamin (B12 ACTIVE ORAL) Take 1 tablet by mouth daily.      meloxicam (MOBIC) 7.5 mg tablet Take 1 tablet (7.5 mg total) by mouth daily. 30 tablet 0    multivitamin capsule Take 1 capsule by mouth daily.       No current facility-administered medications for this visit.     Past Medical History:   Diagnosis Date    Allergic rhinitis 08/02/2013    Overview:     Back pain 08/02/2013    Overview:     Colon polyp 06/17/2021    colon polyp Dr Tate    Elevated cholesterol 07/25/2022    o CT calcium score in 2023    H/O tinnitus 07/17/2014     "Overview:      Past Surgical History:   Procedure Laterality Date    COLONOSCOPY  2021    Dr Tate    ELBOW SURGERY Right 2009    right radial head surgery    ULNAR NERVE TRANSPOSITION  2016    approx    VASECTOMY       Social History     Tobacco Use    Smoking status: Never    Smokeless tobacco: Never   Vaping Use    Vaping Use: Never used   Substance Use Topics    Alcohol use: Yes     Comment: socially     Family History   Problem Relation Age of Onset    Hyperlipidemia Biological Father     Heart disease Biological Father     Alcohol abuse Biological Sister     Diabetes Biological Brother         possible pre DM    Hyperlipidemia Biological Brother     Thyroid disease Biological Brother     Cancer Maternal Grandmother     Diabetes Maternal Grandmother     Cancer Paternal Grandmother 40        brain tumor    Colon cancer Paternal Grandfather        Objective   Vitals:    07/22/24 0823   BP: 122/70   BP Location: Left upper arm   Patient Position: Sitting   Pulse: 79   Resp: 16   Temp: 36.7 °C (98.1 °F)   TempSrc: Temporal   SpO2: 97%   Weight: 101 kg (221 lb 9.6 oz)   Height: 1.702 m (5' 7\")    Body mass index is 34.71 kg/m².  Physical Exam  Vitals reviewed.   Constitutional:       Appearance: Normal appearance. He is not toxic-appearing.   HENT:      Head: Normocephalic and atraumatic.      Right Ear: Tympanic membrane and ear canal normal.      Left Ear: Tympanic membrane and ear canal normal.      Nose: Nose normal. No congestion.      Mouth/Throat:      Mouth: Mucous membranes are moist.   Eyes:      Extraocular Movements: Extraocular movements intact.      Conjunctiva/sclera: Conjunctivae normal.      Pupils: Pupils are equal, round, and reactive to light.   Neck:      Vascular: No carotid bruit.   Cardiovascular:      Rate and Rhythm: Normal rate and regular rhythm.      Pulses: Normal pulses.      Heart sounds: No murmur heard.  Pulmonary:      Effort: Pulmonary effort is normal.      Breath sounds: Normal " breath sounds. No wheezing or rhonchi.   Abdominal:      General: Abdomen is flat.      Palpations: Abdomen is soft.      Tenderness: There is no abdominal tenderness. There is no guarding or rebound.      Hernia: No hernia is present.   Musculoskeletal:         General: Normal range of motion.      Cervical back: Normal range of motion and neck supple.      Right lower leg: No edema.      Left lower leg: No edema.   Lymphadenopathy:      Cervical: No cervical adenopathy.   Skin:     General: Skin is warm.      Findings: No bruising, erythema, lesion or rash.   Neurological:      General: No focal deficit present.      Mental Status: He is alert and oriented to person, place, and time.   Psychiatric:         Mood and Affect: Mood normal.         Behavior: Behavior normal.         Assessment/Plan   Diagnoses and all orders for this visit:    Routine general medical examination at a health care facility (Primary)  Assessment & Plan:  Have routine labs  CT calcium score was 0 in 2023.  Repeat in 20 26-20 28  Tdap up-to-date  : Up-to-date.  Due in 2026    Orders:  -     CBC and Differential  -     Comprehensive metabolic panel  -     Hemoglobin A1c  -     Lipid panel  -     TSH 3rd Generation  -     PSA  -     Urinalysis with microscopic  -     Vitamin B12  -     Vitamin D 25 hydroxy  -     Vitamin B12  -     HEPATITIS C AB W/RFX TO HCV RNA,PCR    Allergic rhinitis, unspecified seasonality, unspecified trigger  Assessment & Plan:  Ipratropium       Acute left-sided back pain, unspecified back location  Assessment & Plan:  Patient has seen the chiropractor.  Currently seeing him.  Flexeril did not help.  Suggested if not improving should consider PT.  Had an x-ray at the chiropractor's office but do not have a reading.  Appears to be in the left SI joint area.  Tight hamstrings noted on exam.  No radicular symptoms      BMI 34.0-34.9,adult  Assessment & Plan:  Patient remains with a BMI of 34.  Encouraged thinking about  a sleep study.  Asked him to discuss with pulmonary at his next visit to follow-up for abnormalities found on the CT lungs.  Questionable ILD      Colon cancer screening  Assessment & Plan:  Colonoscopy is up-to-date.  Due again in 2026      Elevated cholesterol  Assessment & Plan:  Lab Results   Component Value Date    CHOL 215 (H) 08/19/2023    CHOL 206 (H) 07/16/2022    CHOL 198 07/01/2021     Lab Results   Component Value Date    HDL 34 (L) 08/19/2023    HDL 36 (L) 07/16/2022    HDL 44 07/01/2021     Lab Results   Component Value Date    LDLCALC 146 (H) 08/19/2023    LDLCALC 136 (H) 07/16/2022    LDLCALC 137 (H) 07/01/2021     Lab Results   Component Value Date    TRIG 211 (H) 08/19/2023    TRIG 195 (H) 07/16/2022    TRIG 73 07/01/2021     Patient is not taking any meds.  CT calcium score was 0.  Recheck lipids today.      Snoring  Assessment & Plan:  Recommend sleep study.  Asked patient to discuss with Dr. Curtis at his next follow-up for his abnormal CT scan of the lungs      H/O tinnitus    Abnormal CT lung screening  Assessment & Plan:  Patient seeing Dr. Curtis.  Patient is asymptomatic.  There are questionable signs of ILD.  Repeat PFTs and CT scan in 6 months and follow-up with Dr. Curtis.  As above ask about a sleep study for his snoring          Patient agrees and verbalizes understanding of medication and treatment plan discussed at today's visit.  Patient was instructed to call or follow-up if symptoms persist or worsen.         Dona Pascual, DO      This note was created with voice recognition software. Inadvertent dictation errors should be disregarded. Please contact my office for clarification.

## 2024-07-22 NOTE — ASSESSMENT & PLAN NOTE
Patient has seen the chiropractor.  Currently seeing him.  Flexeril did not help.  Suggested if not improving should consider PT.  Had an x-ray at the chiropractor's office but do not have a reading.  Appears to be in the left SI joint area.  Tight hamstrings noted on exam.  No radicular symptoms

## 2024-07-22 NOTE — ASSESSMENT & PLAN NOTE
Have routine labs  CT calcium score was 0 in 2023.  Repeat in 20 26-20 28  Tdap up-to-date  : Up-to-date.  Due in 2026

## 2024-07-22 NOTE — ASSESSMENT & PLAN NOTE
Patient remains with a BMI of 34.  Encouraged thinking about a sleep study.  Asked him to discuss with pulmonary at his next visit to follow-up for abnormalities found on the CT lungs.  Questionable ILD

## 2024-07-22 NOTE — ASSESSMENT & PLAN NOTE
Lab Results   Component Value Date    CHOL 215 (H) 08/19/2023    CHOL 206 (H) 07/16/2022    CHOL 198 07/01/2021     Lab Results   Component Value Date    HDL 34 (L) 08/19/2023    HDL 36 (L) 07/16/2022    HDL 44 07/01/2021     Lab Results   Component Value Date    LDLCALC 146 (H) 08/19/2023    LDLCALC 136 (H) 07/16/2022    LDLCALC 137 (H) 07/01/2021     Lab Results   Component Value Date    TRIG 211 (H) 08/19/2023    TRIG 195 (H) 07/16/2022    TRIG 73 07/01/2021     Patient is not taking any meds.  CT calcium score was 0.  Recheck lipids today.

## 2024-07-22 NOTE — ASSESSMENT & PLAN NOTE
Patient seeing Dr. Curtis.  Patient is asymptomatic.  There are questionable signs of ILD.  Repeat PFTs and CT scan in 6 months and follow-up with Dr. Curtis.  As above ask about a sleep study for his snoring

## 2024-07-24 LAB
25(OH)D3+25(OH)D2 SERPL-MCNC: 31 NG/ML (ref 30–100)
ALBUMIN SERPL-MCNC: 4.3 G/DL (ref 3.6–5.1)
ALBUMIN/GLOB SERPL: 1.9 (CALC) (ref 1–2.5)
ALP SERPL-CCNC: 82 U/L (ref 35–144)
ALT SERPL-CCNC: 21 U/L (ref 9–46)
APPEARANCE UR: CLEAR
AST SERPL-CCNC: 25 U/L (ref 10–35)
BACTERIA #/AREA URNS HPF: NORMAL /HPF
BASOPHILS # BLD AUTO: 39 CELLS/UL (ref 0–200)
BASOPHILS NFR BLD AUTO: 0.7 %
BILIRUB SERPL-MCNC: 0.4 MG/DL (ref 0.2–1.2)
BILIRUB UR QL STRIP: NEGATIVE
BUN SERPL-MCNC: 14 MG/DL (ref 7–25)
BUN/CREAT SERPL: ABNORMAL (CALC) (ref 6–22)
CALCIUM SERPL-MCNC: 9.1 MG/DL (ref 8.6–10.3)
CHLORIDE SERPL-SCNC: 105 MMOL/L (ref 98–110)
CHOLEST SERPL-MCNC: 212 MG/DL
CHOLEST/HDLC SERPL: 7.6 (CALC)
CO2 SERPL-SCNC: 23 MMOL/L (ref 20–32)
COLOR UR: YELLOW
CREAT SERPL-MCNC: 1.06 MG/DL (ref 0.7–1.3)
EGFRCR SERPLBLD CKD-EPI 2021: 83 ML/MIN/1.73M2
EOSINOPHIL # BLD AUTO: 280 CELLS/UL (ref 15–500)
EOSINOPHIL NFR BLD AUTO: 5 %
ERYTHROCYTE [DISTWIDTH] IN BLOOD BY AUTOMATED COUNT: 12.1 % (ref 11–15)
GLOBULIN SER CALC-MCNC: 2.3 G/DL (CALC) (ref 1.9–3.7)
GLUCOSE SERPL-MCNC: 104 MG/DL (ref 65–99)
GLUCOSE UR QL STRIP: NEGATIVE
HBA1C MFR BLD: 5.3 % OF TOTAL HGB
HCT VFR BLD AUTO: 43.9 % (ref 38.5–50)
HCV AB SERPL QL IA: NORMAL
HDLC SERPL-MCNC: 28 MG/DL
HGB BLD-MCNC: 15.1 G/DL (ref 13.2–17.1)
HGB UR QL STRIP: NEGATIVE
HYALINE CASTS #/AREA URNS LPF: NORMAL /LPF
KETONES UR QL STRIP: NEGATIVE
LDLC SERPL CALC-MCNC: ABNORMAL MG/DL (CALC)
LEUKOCYTE ESTERASE UR QL STRIP: NEGATIVE
LYMPHOCYTES # BLD AUTO: 1859 CELLS/UL (ref 850–3900)
LYMPHOCYTES NFR BLD AUTO: 33.2 %
MCH RBC QN AUTO: 31.7 PG (ref 27–33)
MCHC RBC AUTO-ENTMCNC: 34.4 G/DL (ref 32–36)
MCV RBC AUTO: 92.2 FL (ref 80–100)
MONOCYTES # BLD AUTO: 538 CELLS/UL (ref 200–950)
MONOCYTES NFR BLD AUTO: 9.6 %
NEUTROPHILS # BLD AUTO: 2884 CELLS/UL (ref 1500–7800)
NEUTROPHILS NFR BLD AUTO: 51.5 %
NITRITE UR QL STRIP: NEGATIVE
NONHDLC SERPL-MCNC: 184 MG/DL (CALC)
PH UR STRIP: 5.5 [PH] (ref 5–8)
PLATELET # BLD AUTO: 242 THOUSAND/UL (ref 140–400)
PMV BLD REES-ECKER: 9.5 FL (ref 7.5–12.5)
POTASSIUM SERPL-SCNC: 4.3 MMOL/L (ref 3.5–5.3)
PROT SERPL-MCNC: 6.6 G/DL (ref 6.1–8.1)
PROT UR QL STRIP: NEGATIVE
PSA SERPL-MCNC: 0.56 NG/ML
RBC # BLD AUTO: 4.76 MILLION/UL (ref 4.2–5.8)
RBC #/AREA URNS HPF: NORMAL /HPF
SERVICE CMNT-IMP: NORMAL
SODIUM SERPL-SCNC: 137 MMOL/L (ref 135–146)
SP GR UR STRIP: 1.02 (ref 1–1.03)
SQUAMOUS #/AREA URNS HPF: NORMAL /HPF
TRIGL SERPL-MCNC: 553 MG/DL
TSH SERPL-ACNC: 2.36 MIU/L (ref 0.4–4.5)
VIT B12 SERPL-MCNC: 446 PG/ML (ref 200–1100)
WBC # BLD AUTO: 5.6 THOUSAND/UL (ref 3.8–10.8)
WBC #/AREA URNS HPF: NORMAL /HPF

## 2024-08-11 NOTE — RESULT ENCOUNTER NOTE
Virgil  The CBC was ok.  the CMP was ok. The total cholesterol was high at 212 and the triglycerides were high at 553.  I would suggest a medication ( fenofibrate ) to  bring the triglycerides down and re ck labs in 3 months,   if you do not want to do that we can ck in 6 months with diet and exercise. If you are interested in discussion of the fenofibrate please let me know and will review option with you

## 2024-10-22 ENCOUNTER — NURSE TRIAGE (OUTPATIENT)
Dept: FAMILY MEDICINE | Facility: CLINIC | Age: 54
End: 2024-10-22

## 2024-10-22 ENCOUNTER — OFFICE VISIT (OUTPATIENT)
Dept: FAMILY MEDICINE | Facility: CLINIC | Age: 54
End: 2024-10-22
Payer: COMMERCIAL

## 2024-10-22 VITALS
HEIGHT: 67 IN | WEIGHT: 223.4 LBS | TEMPERATURE: 98 F | HEART RATE: 73 BPM | SYSTOLIC BLOOD PRESSURE: 124 MMHG | DIASTOLIC BLOOD PRESSURE: 66 MMHG | OXYGEN SATURATION: 94 % | RESPIRATION RATE: 18 BRPM | BODY MASS INDEX: 35.06 KG/M2

## 2024-10-22 DIAGNOSIS — M54.41 ACUTE RIGHT-SIDED LOW BACK PAIN WITH RIGHT-SIDED SCIATICA: Primary | ICD-10-CM

## 2024-10-22 PROCEDURE — 99213 OFFICE O/P EST LOW 20 MIN: CPT | Performed by: PHYSICIAN ASSISTANT

## 2024-10-22 PROCEDURE — 3008F BODY MASS INDEX DOCD: CPT | Performed by: PHYSICIAN ASSISTANT

## 2024-10-22 RX ORDER — CYCLOBENZAPRINE HCL 10 MG
10 TABLET ORAL NIGHTLY PRN
Qty: 14 TABLET | Refills: 0 | Status: SHIPPED | OUTPATIENT
Start: 2024-10-22 | End: 2024-11-04 | Stop reason: SDUPTHER

## 2024-10-22 RX ORDER — MELOXICAM 15 MG/1
15 TABLET ORAL DAILY
Qty: 14 TABLET | Refills: 0 | Status: SHIPPED | OUTPATIENT
Start: 2024-10-22 | End: 2024-11-04 | Stop reason: SDUPTHER

## 2024-10-22 ASSESSMENT — ENCOUNTER SYMPTOMS
NAUSEA: 0
ABDOMINAL PAIN: 0
CONSTIPATION: 0
BACK PAIN: 1
WHEEZING: 0
CHEST TIGHTNESS: 0
DIARRHEA: 0
PALPITATIONS: 0
SHORTNESS OF BREATH: 0
COUGH: 0
VOMITING: 0

## 2024-10-22 NOTE — PROGRESS NOTES
[facility] in [place]         Virgil Mejia is a 54 y.o. male who presents for     He c/o pain in the right lower back radiating down the right leg to the right ankle x 4 days.  Pain is improved with sitting/laying down. Pain is worsened with standing/walking. He is taking advil and using heat. Heat is helpful but advil does not provide much relief. He states that it feels like vibration in the lower leg. He describes pain as aching, throbbing, and shooting. He feels that pain is getting worse. He denies any known injuries.            Past Medical History:   Diagnosis Date    Allergic rhinitis 08/02/2013    Overview:     Back pain 08/02/2013    Overview:     Colon polyp 06/17/2021    colon polyp Dr Tate    Elevated cholesterol 07/25/2022    o CT calcium score in 2023    H/O tinnitus 07/17/2014    Overview:        Past Surgical History   Procedure Laterality Date    Colonoscopy  2021    Dr Tate    Elbow surgery Right 2009    right radial head surgery    Ulnar nerve transposition  2016    approx    Vasectomy         Social History     Tobacco Use    Smoking status: Never    Smokeless tobacco: Never   Vaping Use    Vaping status: Never Used   Substance Use Topics    Alcohol use: Yes     Comment: socially       Family History   Problem Relation Name Age of Onset    Hyperlipidemia Biological Father      Heart disease Biological Father      Alcohol abuse Biological Sister      Diabetes Biological Brother          possible pre DM    Hyperlipidemia Biological Brother      Thyroid disease Biological Brother      Cancer Maternal Grandmother      Diabetes Maternal Grandmother      Cancer Paternal Grandmother  40        brain tumor    Colon cancer Paternal Grandfather         No known allergies    Current Outpatient Medications   Medication Sig Dispense Refill    cholecalciferol, vitamin D3, 1,000 unit (25 mcg) tablet Take 1,000 Units by mouth daily.      ipratropium (ATROVENT) 21 mcg  "(0.03 %) nasal spray Administer 2 sprays into each nostril every 12 (twelve) hours. 30 mL 3    mecobalamin (B12 ACTIVE ORAL) Take 1 tablet by mouth daily.      multivitamin capsule Take 1 capsule by mouth daily.      meloxicam (MOBIC) 7.5 mg tablet Take 1 tablet (7.5 mg total) by mouth daily. 30 tablet 0     No current facility-administered medications for this visit.       Review of Systems   Respiratory:  Negative for cough, chest tightness, shortness of breath and wheezing.    Cardiovascular:  Negative for chest pain, palpitations and leg swelling.   Gastrointestinal:  Negative for abdominal pain, constipation, diarrhea, nausea and vomiting.   Musculoskeletal:  Positive for back pain.       Objective   Vitals:    10/22/24 1326   BP: 124/66   Pulse: 73   Resp: 18   Temp: 36.7 °C (98 °F)   SpO2: 94%   Weight: 101 kg (223 lb 6.4 oz)   Height: 1.702 m (5' 7\")     Body mass index is 34.99 kg/m².    Physical Exam  Constitutional:       Appearance: Normal appearance. He is obese.   HENT:      Head: Normocephalic and atraumatic.      Right Ear: External ear normal.      Left Ear: External ear normal.      Nose: Nose normal.   Pulmonary:      Effort: Pulmonary effort is normal.   Musculoskeletal:      Lumbar back: No tenderness. Negative right straight leg raise test and negative left straight leg raise test.      Right lower leg: No edema.      Left lower leg: No edema.      Comments: Pain with rotation to the left.    Neurological:      General: No focal deficit present.      Mental Status: He is alert.   Psychiatric:         Mood and Affect: Mood normal.         Behavior: Behavior normal.         Lab Results   Component Value Date    WBC 5.6 07/22/2024    HGB 15.1 07/22/2024    HCT 43.9 07/22/2024     07/22/2024    CHOL 212 (H) 07/22/2024    TRIG 553 (H) 07/22/2024    HDL 28 (L) 07/22/2024    ALT 21 07/22/2024    AST 25 07/22/2024     07/22/2024    K 4.3 07/22/2024     07/22/2024    CREATININE 1.06 " 07/22/2024    BUN 14 07/22/2024    CO2 23 07/22/2024    TSH 2.36 07/22/2024    PSA 0.56 07/22/2024    HGBA1C 5.3 07/22/2024         Assessment   Assessment & Plan  Acute right-sided low back pain with right-sided sciatica  New onset. Prescribed cyclobenzaprine and meloxicam. Discussed heat and ice. Follow up if symptoms worsen or do not improve.                   PATRICK Garcia  10/22/2024

## 2024-10-22 NOTE — TELEPHONE ENCOUNTER
Regarding: severe pain  ----- Message from Ranjit BARROS sent at 10/22/2024 12:08 PM EDT -----  Red Flag: Patient is having severe nerve pain; level 8 and causing difficulty walking. He advised the pain started about 5 days ago,but has worsened in the last two days.

## 2024-10-22 NOTE — TELEPHONE ENCOUNTER
"Patient transferred to the Primary Care Telephonic Nurse Triage Line with complaints of back pain    HPI:  Pt triaged for back pain on left side/buttocks down leg to heel. Usually right side, now left. Pt says location today is from left buttocks/coccyx area all the way down to heel. Denies foot numb or blue. Pt says using rx from doctor 'mabye too much' and asks for in person eval. Dispo is see today.PCP first available is tomorrow, pt requests sds and dispo is see today.  Appointment today at 130pm with Louis.  Disposition:  See Today in Office    Care Advice:  Care Advice Given    Patient/Caregiver understands and will follow care advice?: Yes, plans to follow advice      Given By Given At Kanika Nash 10/22/2024 12:17 PM Yes           Disposition and First Aid    SEE IN OFFICE TODAY:   * You need to be examined today. Let me give you an appointment today at 130pm with Louis Nash 10/22/2024 12:17 PM No           Back Pain    REASSURANCE AND EDUCATION - BACK PAIN:  * Back pain can result from excessive twisting, heavy lifting, or from un-noticed minor back injuries.  * Usually the pain gets better in 1 to 2 weeks.  * You can treat most back pain at home.  * Here is some care advice that should help.    Katia Nash 10/22/2024 12:17 PM No           Preventing Back Strain    CALL BACK IF:  * You have more questions  * You become worse           Reason for Disposition   SEVERE back pain (e.g., excruciating, unable to do any normal activities) and not improved after pain medicine and CARE ADVICE    Answer Assessment - Initial Assessment Questions  1. ONSET: \"When did the pain begin?\"       months  2. LOCATION: \"Where does it hurt?\" (upper, mid or lower back)      left side sciatica, now right side   3. SEVERITY: \"How bad is the pain?\"  (e.g., Scale 1-10; mild, moderate, or severe)    8/10  4. PATTERN: \"Is the pain constant?\" (e.g., yes, no; constant, intermittent)       thinks " "from work out  5. RADIATION: \"Does the pain shoot into your legs or somewhere else?\"     buttocks/coccyx  down to heel  6. CAUSE:  \"What do you think is causing the back pain?\"       a certain work out   7. BACK OVERUSE:  \"Any recent lifting of heavy objects, strenuous work or exercise?\"        8. MEDICINES: \"What have you taken so far for the pain?\" (e.g., nothing, acetaminophen, NSAIDS)      usually rx   9. NEUROLOGIC SYMPTOMS: \"Do you have any weakness, numbness, or problems with bowel/bladder control?\"      to heel   10. OTHER SYMPTOMS: \"Do you have any other symptoms?\" (e.g., fever, abdomen pain, burning with urination, blood in urine)          11. PREGNANCY: \"Is there any chance you are pregnant?\" \"When was your last menstrual period?\"    Protocols used: Back Pain-ADULT-[place]    "

## 2024-10-22 NOTE — ASSESSMENT & PLAN NOTE
New onset. Prescribed cyclobenzaprine and meloxicam. Discussed heat and ice. Follow up if symptoms worsen or do not improve.

## 2024-11-04 RX ORDER — MELOXICAM 15 MG/1
15 TABLET ORAL DAILY
Qty: 14 TABLET | Refills: 0 | Status: SHIPPED | OUTPATIENT
Start: 2024-11-04 | End: 2024-11-15 | Stop reason: SDUPTHER

## 2024-11-04 RX ORDER — CYCLOBENZAPRINE HCL 10 MG
10 TABLET ORAL NIGHTLY PRN
Qty: 14 TABLET | Refills: 0 | Status: SHIPPED | OUTPATIENT
Start: 2024-11-04 | End: 2024-11-15 | Stop reason: SDUPTHER

## 2024-11-04 NOTE — TELEPHONE ENCOUNTER
Medicine Refill Request    Last Office Visit: 10/22/2024   Last Consult Visit: Visit date not found  Last Telemedicine Visit: Visit date not found    Next Appointment: 7/28/2025      Current Outpatient Medications:     cholecalciferol, vitamin D3, 1,000 unit (25 mcg) tablet, Take 1,000 Units by mouth daily., Disp: , Rfl:     cyclobenzaprine (FLEXERIL) 10 mg tablet, Take 1 tablet (10 mg total) by mouth nightly as needed for muscle spasms for up to 14 days., Disp: 14 tablet, Rfl: 0    ipratropium (ATROVENT) 21 mcg (0.03 %) nasal spray, Administer 2 sprays into each nostril every 12 (twelve) hours., Disp: 30 mL, Rfl: 3    mecobalamin (B12 ACTIVE ORAL), Take 1 tablet by mouth daily., Disp: , Rfl:     meloxicam (MOBIC) 15 mg tablet, Take 1 tablet (15 mg total) by mouth daily for 14 days., Disp: 14 tablet, Rfl: 0    multivitamin capsule, Take 1 capsule by mouth daily., Disp: , Rfl:       BP Readings from Last 3 Encounters:   10/22/24 124/66   07/22/24 122/70   11/15/23 128/84       Recent Lab results:  Lab Results   Component Value Date    CHOL 212 (H) 07/22/2024   ,   Lab Results   Component Value Date    HDL 28 (L) 07/22/2024   ,   Lab Results   Component Value Date    LDLCALC SEE COMMENT 07/22/2024   ,   Lab Results   Component Value Date    TRIG 553 (H) 07/22/2024        Lab Results   Component Value Date    GLUCOSE 104 (H) 07/22/2024    GLUCOSE NEGATIVE 07/22/2024   ,   Lab Results   Component Value Date    HGBA1C 5.3 07/22/2024         Lab Results   Component Value Date    CREATININE 1.06 07/22/2024       Lab Results   Component Value Date    TSH 2.36 07/22/2024           Lab Results   Component Value Date    HGBA1C 5.3 07/22/2024

## 2024-11-08 ENCOUNTER — HOSPITAL ENCOUNTER (OUTPATIENT)
Dept: RADIOLOGY | Age: 54
Discharge: HOME | End: 2024-11-08
Attending: FAMILY MEDICINE
Payer: COMMERCIAL

## 2024-11-08 ENCOUNTER — TELEPHONE (OUTPATIENT)
Dept: FAMILY MEDICINE | Facility: CLINIC | Age: 54
End: 2024-11-08
Payer: COMMERCIAL

## 2024-11-08 DIAGNOSIS — M54.41 ACUTE RIGHT-SIDED LOW BACK PAIN WITH RIGHT-SIDED SCIATICA: Primary | ICD-10-CM

## 2024-11-08 DIAGNOSIS — M54.10 RADICULAR SYNDROME OF RIGHT LEG: ICD-10-CM

## 2024-11-08 DIAGNOSIS — M54.41 ACUTE RIGHT-SIDED LOW BACK PAIN WITH RIGHT-SIDED SCIATICA: ICD-10-CM

## 2024-11-08 PROCEDURE — 72100 X-RAY EXAM L-S SPINE 2/3 VWS: CPT

## 2024-11-08 NOTE — TELEPHONE ENCOUNTER
Called patient he states the pain is more radiating down his legs and not so much in his back. He also wants to understand why he should get an X-ray for his back when he is having more pain in the legs. He would like to speak with doctor regarding this issue. He says that he is taking the meloxicam as prescribed but is not having any relief. Please Advise.

## 2024-11-08 NOTE — TELEPHONE ENCOUNTER
Request for Medical Advice (NON-URGENT)   Patient PCP: Dona Pascual, DO  New or Existing Issue: Existing  Question or Concern: Patient requesting a script for MRI for his back  Preferred Pharmacy:   [facility]/pharmacy #[id number] - [place], [place] - [address]  [address]  [place] [address]  Phone: [phone] Fax: [phone]      The practice will reach out to discuss your Medical Question or Concern within 2 business days.

## 2024-11-11 NOTE — TELEPHONE ENCOUNTER
Spoke with patient.  He is going on about week 4 of his symptoms.  He has been using Motrin and now is using the meloxicam given to him by Marga Farah.  He is also taking a muscle relaxant.  The pain will radiate to his calf and butt cheek on the right.  He does not have weakness.  It waxes and wanes.  His last MRI he thinks was in 2010.  He does do stretching and exercises from previous physical therapy and has been doing that for several weeks.  Plans on trying to see the chiropractor this week.  X-ray was done after review will see if MRI indicated

## 2024-11-15 RX ORDER — CYCLOBENZAPRINE HCL 10 MG
10 TABLET ORAL NIGHTLY PRN
Qty: 30 TABLET | Refills: 0 | Status: SHIPPED | OUTPATIENT
Start: 2024-11-15 | End: 2024-12-04

## 2024-11-15 RX ORDER — MELOXICAM 15 MG/1
15 TABLET ORAL DAILY
Qty: 30 TABLET | Refills: 0 | Status: SHIPPED | OUTPATIENT
Start: 2024-11-15 | End: 2024-12-15

## 2024-12-03 NOTE — TELEPHONE ENCOUNTER
Medicine Refill Request    Last Office Visit: 10/22/2024   Last Consult Visit: Visit date not found  Last Telemedicine Visit: Visit date not found    Next Appointment: 7/28/2025      Current Outpatient Medications:     cholecalciferol, vitamin D3, 1,000 unit (25 mcg) tablet, Take 1,000 Units by mouth daily., Disp: , Rfl:     cyclobenzaprine (FLEXERIL) 10 mg tablet, Take 1 tablet (10 mg total) by mouth nightly as needed for muscle spasms., Disp: 30 tablet, Rfl: 0    ipratropium (ATROVENT) 21 mcg (0.03 %) nasal spray, Administer 2 sprays into each nostril every 12 (twelve) hours., Disp: 30 mL, Rfl: 3    mecobalamin (B12 ACTIVE ORAL), Take 1 tablet by mouth daily., Disp: , Rfl:     meloxicam (MOBIC) 15 mg tablet, Take 1 tablet (15 mg total) by mouth daily., Disp: 30 tablet, Rfl: 0    multivitamin capsule, Take 1 capsule by mouth daily., Disp: , Rfl:       BP Readings from Last 3 Encounters:   10/22/24 124/66   07/22/24 122/70   11/15/23 128/84       Recent Lab results:  Lab Results   Component Value Date    CHOL 212 (H) 07/22/2024   ,   Lab Results   Component Value Date    HDL 28 (L) 07/22/2024   ,   Lab Results   Component Value Date    LDLCALC SEE COMMENT 07/22/2024   ,   Lab Results   Component Value Date    TRIG 553 (H) 07/22/2024        Lab Results   Component Value Date    GLUCOSE 104 (H) 07/22/2024    GLUCOSE NEGATIVE 07/22/2024   ,   Lab Results   Component Value Date    HGBA1C 5.3 07/22/2024         Lab Results   Component Value Date    CREATININE 1.06 07/22/2024       Lab Results   Component Value Date    TSH 2.36 07/22/2024           Lab Results   Component Value Date    HGBA1C 5.3 07/22/2024

## 2024-12-04 RX ORDER — CYCLOBENZAPRINE HCL 10 MG
TABLET ORAL
Qty: 30 TABLET | Refills: 0 | Status: SHIPPED | OUTPATIENT
Start: 2024-12-04

## 2024-12-06 ENCOUNTER — TELEPHONE (OUTPATIENT)
Dept: FAMILY MEDICINE | Facility: CLINIC | Age: 54
End: 2024-12-06
Payer: COMMERCIAL

## 2024-12-06 NOTE — TELEPHONE ENCOUNTER
[facility] Appointment Request   Provider:   Appointment Type: OV/diagnostic visit   Reason for Visit: Patient would like to get injections in his back. Available Day and Time: Any time/day   Best Contact Number: [phone]    The practice will reach out to schedule your appointment within the next 2 business days.

## 2024-12-06 NOTE — TELEPHONE ENCOUNTER
Spoke with pt and suggested he get the MRI and see specialist . Gave numbers for Dr Jaison Dior or Dr Tez Coello to evaluate after the MRI

## 2024-12-09 ENCOUNTER — HOSPITAL ENCOUNTER (OUTPATIENT)
Dept: RADIOLOGY | Facility: HOSPITAL | Age: 54
Discharge: HOME | End: 2024-12-09
Attending: FAMILY MEDICINE
Payer: COMMERCIAL

## 2024-12-09 DIAGNOSIS — M54.41 ACUTE RIGHT-SIDED LOW BACK PAIN WITH RIGHT-SIDED SCIATICA: ICD-10-CM

## 2024-12-09 DIAGNOSIS — M54.10 RADICULAR SYNDROME OF RIGHT LEG: ICD-10-CM

## 2024-12-09 PROCEDURE — 72148 MRI LUMBAR SPINE W/O DYE: CPT

## 2025-01-01 NOTE — RESULT ENCOUNTER NOTE
Virgil  As per our discussion you do have some multilevel degenerative change with some osteophytes.  Recommend seeing orthopedist.  Can use the flexeril and meloxicam until seen and PT if want . Please call with questions or new symptoms

## 2025-04-28 NOTE — TELEPHONE ENCOUNTER
Spoke to patient , he is still continuing to have ongoing back pain. He wants to injections for his back. Can this be done in the office or do you need to refer him to someone ?   5

## 2025-07-24 ENCOUNTER — TELEPHONE (OUTPATIENT)
Dept: FAMILY MEDICINE | Facility: CLINIC | Age: 55
End: 2025-07-24
Payer: COMMERCIAL

## 2025-07-24 DIAGNOSIS — R79.89 LOW VITAMIN D LEVEL: ICD-10-CM

## 2025-07-24 DIAGNOSIS — Z00.00 ROUTINE GENERAL MEDICAL EXAMINATION AT A HEALTH CARE FACILITY: Primary | ICD-10-CM

## 2025-07-24 DIAGNOSIS — E78.00 ELEVATED CHOLESTEROL: ICD-10-CM

## 2025-07-24 SDOH — ECONOMIC STABILITY: INCOME INSECURITY: IN THE LAST 12 MONTHS, WAS THERE A TIME WHEN YOU WERE NOT ABLE TO PAY THE MORTGAGE OR RENT ON TIME?: PATIENT DECLINED

## 2025-07-24 SDOH — ECONOMIC STABILITY: TRANSPORTATION INSECURITY
IN THE PAST 12 MONTHS, HAS LACK OF TRANSPORTATION KEPT YOU FROM MEETINGS, WORK, OR FROM GETTING THINGS NEEDED FOR DAILY LIVING?: PATIENT DECLINED

## 2025-07-24 SDOH — ECONOMIC STABILITY: TRANSPORTATION INSECURITY
IN THE PAST 12 MONTHS, HAS THE LACK OF TRANSPORTATION KEPT YOU FROM MEDICAL APPOINTMENTS OR FROM GETTING MEDICATIONS?: PATIENT DECLINED

## 2025-07-24 SDOH — ECONOMIC STABILITY: FOOD INSECURITY: WITHIN THE PAST 12 MONTHS, YOU WORRIED THAT YOUR FOOD WOULD RUN OUT BEFORE YOU GOT MONEY TO BUY MORE.: PATIENT DECLINED

## 2025-07-24 SDOH — ECONOMIC STABILITY: FOOD INSECURITY: WITHIN THE PAST 12 MONTHS, THE FOOD YOU BOUGHT JUST DIDN'T LAST AND YOU DIDN'T HAVE MONEY TO GET MORE.: PATIENT DECLINED

## 2025-07-24 ASSESSMENT — SOCIAL DETERMINANTS OF HEALTH (SDOH)
IN THE PAST 12 MONTHS, HAS THE ELECTRIC, GAS, OIL, OR WATER COMPANY THREATENED TO SHUT OFF SERVICE IN YOUR HOME?: PATIENT DECLINED

## 2025-07-28 ENCOUNTER — OFFICE VISIT (OUTPATIENT)
Dept: FAMILY MEDICINE | Facility: CLINIC | Age: 55
End: 2025-07-28
Payer: COMMERCIAL

## 2025-07-28 VITALS
BODY MASS INDEX: 36.44 KG/M2 | HEART RATE: 69 BPM | OXYGEN SATURATION: 98 % | TEMPERATURE: 98.1 F | WEIGHT: 232.2 LBS | SYSTOLIC BLOOD PRESSURE: 108 MMHG | HEIGHT: 67 IN | RESPIRATION RATE: 16 BRPM | DIASTOLIC BLOOD PRESSURE: 70 MMHG

## 2025-07-28 DIAGNOSIS — R91.8 ABNORMAL CT LUNG SCREENING: ICD-10-CM

## 2025-07-28 DIAGNOSIS — J84.09 PARIETOALVEOLAR PNEUMOPATHY (CMS/HCC): ICD-10-CM

## 2025-07-28 DIAGNOSIS — E78.00 ELEVATED CHOLESTEROL: ICD-10-CM

## 2025-07-28 DIAGNOSIS — Z12.11 COLON CANCER SCREENING: ICD-10-CM

## 2025-07-28 DIAGNOSIS — Z00.00 ROUTINE GENERAL MEDICAL EXAMINATION AT A HEALTH CARE FACILITY: Primary | ICD-10-CM

## 2025-07-28 DIAGNOSIS — J30.5 ALLERGIC RHINITIS DUE TO FOOD: ICD-10-CM

## 2025-07-28 DIAGNOSIS — R06.83 SNORING: ICD-10-CM

## 2025-07-28 DIAGNOSIS — M48.061 DEGENERATIVE LUMBAR SPINAL STENOSIS: ICD-10-CM

## 2025-07-28 PROBLEM — M47.816 LUMBAR SPONDYLOSIS: Status: ACTIVE | Noted: 2025-01-06

## 2025-07-28 PROCEDURE — 36415 COLL VENOUS BLD VENIPUNCTURE: CPT | Performed by: FAMILY MEDICINE

## 2025-07-28 PROCEDURE — 99396 PREV VISIT EST AGE 40-64: CPT | Mod: 25 | Performed by: FAMILY MEDICINE

## 2025-07-28 PROCEDURE — 90715 TDAP VACCINE 7 YRS/> IM: CPT | Performed by: FAMILY MEDICINE

## 2025-07-28 PROCEDURE — 90471 IMMUNIZATION ADMIN: CPT | Performed by: FAMILY MEDICINE

## 2025-07-28 PROCEDURE — 3008F BODY MASS INDEX DOCD: CPT | Performed by: FAMILY MEDICINE

## 2025-07-28 RX ORDER — PREGABALIN 100 MG/1
CAPSULE ORAL
COMMUNITY

## 2025-07-28 ASSESSMENT — ENCOUNTER SYMPTOMS
CONSTITUTIONAL NEGATIVE: 1
BACK PAIN: 1
HEMATOLOGIC/LYMPHATIC NEGATIVE: 1
PSYCHIATRIC NEGATIVE: 1
CARDIOVASCULAR NEGATIVE: 1
NEUROLOGICAL NEGATIVE: 1
EYES NEGATIVE: 1
RESPIRATORY NEGATIVE: 1
ENDOCRINE NEGATIVE: 1
ALLERGIC/IMMUNOLOGIC NEGATIVE: 1
GASTROINTESTINAL NEGATIVE: 1

## 2025-07-28 ASSESSMENT — PATIENT HEALTH QUESTIONNAIRE - PHQ9: SUM OF ALL RESPONSES TO PHQ9 QUESTIONS 1 & 2: 0

## 2025-07-28 NOTE — PROGRESS NOTES
Subjective      Patient ID: Virgil Mejia is a 55 y.o. male.    Pt here for physical,  will do labs today,  did gain some weight, pt seeing [facility] for [facility];  got on with Dr Dior and felt worse, he will get injection this Friday thru [facility]  .  On lyrica 100 daily now at night . Takes meloxicam each am and  taking excedrin with it on his own ( discussed rec tylenol instead>  also reviewed his lung findings noted when had the CT calcium score . Saw Dr Curtis         Tobacco  Allergies  Meds  Problems  Med Hx  Surg Hx  Fam Hx  Soc   Hx      Review of Systems   Constitutional: Negative.    HENT: Negative.     Eyes: Negative.    Respiratory: Negative.     Cardiovascular: Negative.    Gastrointestinal: Negative.    Endocrine: Negative.    Genitourinary: Negative.    Musculoskeletal:  Positive for back pain.   Skin: Negative.    Allergic/Immunologic: Negative.    Neurological: Negative.    Hematological: Negative.    Psychiatric/Behavioral: Negative.       Allergies   Allergen Reactions    No Known Allergies      Current Outpatient Medications   Medication Sig Dispense Refill    cholecalciferol, vitamin D3, 1,000 unit (25 mcg) tablet Take 1,000 Units by mouth daily.      ipratropium (ATROVENT) 21 mcg (0.03 %) nasal spray Administer 2 sprays into each nostril every 12 (twelve) hours. 30 mL 3    mecobalamin (B12 ACTIVE ORAL) Take 1 tablet by mouth daily.      meloxicam (MOBIC) 15 mg tablet Take 1 tablet (15 mg total) by mouth daily. 30 tablet 0    multivitamin capsule Take 1 capsule by mouth daily.      pregabalin (LYRICA) 100 mg capsule TAKE 1 CAPSULE BY MOUTH EVERYDAY AT BEDTIME       No current facility-administered medications for this visit.     Past Medical History:   Diagnosis Date    Allergic rhinitis 08/02/2013    Overview:     Back pain 08/02/2013    Overview:     Colon polyp 06/17/2021    colon polyp Dr Tate    Elevated cholesterol 07/25/2022    o CT calcium score in 2023    H/O tinnitus 07/17/2014  "   Overview:      Past Surgical History   Procedure Laterality Date    Colonoscopy  2021    Dr Tate    Elbow surgery Right 2009    right radial head surgery    Ulnar nerve transposition  2016    approx    Vasectomy       Social History     Tobacco Use    Smoking status: Never    Smokeless tobacco: Never   Vaping Use    Vaping status: Never Used   Substance Use Topics    Alcohol use: Yes     Comment: socially    Drug use: Never     Family History   Problem Relation Name Age of Onset    Hyperlipidemia Biological Father      Heart disease Biological Father      Alcohol abuse Biological Sister      Diabetes Biological Brother          possible pre DM    Hyperlipidemia Biological Brother      Thyroid disease Biological Brother      Cancer Maternal Grandmother      Diabetes Maternal Grandmother      Cancer Paternal Grandmother  40        brain tumor    Colon cancer Paternal Grandfather         Objective   Vitals:    07/28/25 0803   BP: 108/70   Pulse: 69   Resp: 16   Temp: 36.7 °C (98.1 °F)   TempSrc: Tympanic   SpO2: 98%   Weight: 105 kg (232 lb 3.2 oz)   Height: 1.702 m (5' 7\")    Body mass index is 36.37 kg/m².  Physical Exam  Vitals reviewed.   Constitutional:       Appearance: Normal appearance. He is not toxic-appearing.   HENT:      Head: Normocephalic and atraumatic.      Right Ear: Tympanic membrane and ear canal normal.      Left Ear: Tympanic membrane and ear canal normal.      Nose: Nose normal. No congestion.      Mouth/Throat:      Mouth: Mucous membranes are moist.   Eyes:      Extraocular Movements: Extraocular movements intact.      Conjunctiva/sclera: Conjunctivae normal.      Pupils: Pupils are equal, round, and reactive to light.   Neck:      Vascular: No carotid bruit.   Cardiovascular:      Rate and Rhythm: Normal rate and regular rhythm.      Pulses: Normal pulses.      Heart sounds: No murmur heard.  Pulmonary:      Effort: Pulmonary effort is normal.      Breath sounds: Normal breath sounds. No " wheezing or rhonchi.   Abdominal:      General: Abdomen is flat.      Palpations: Abdomen is soft.      Tenderness: There is no abdominal tenderness. There is no guarding or rebound.      Hernia: No hernia is present.   Musculoskeletal:         General: Normal range of motion.      Cervical back: Normal range of motion and neck supple.      Right lower leg: No edema.      Left lower leg: No edema.   Lymphadenopathy:      Cervical: No cervical adenopathy.   Skin:     General: Skin is warm.      Findings: No bruising, erythema, lesion or rash.   Neurological:      General: No focal deficit present.      Mental Status: He is alert and oriented to person, place, and time.   Psychiatric:         Mood and Affect: Mood normal.         Behavior: Behavior normal.         Assessment/Plan   Diagnoses and all orders for this visit:    Routine general medical examination at a health care facility ([facility])  Assessment & Plan:  Have labs today   Tdap today   Return for Prevnar 20 in a month  Get flu shot this year  Colon due 6/2025    Orders:  -     CBC and Differential  -     Comprehensive metabolic panel  -     Hemoglobin A1c  -     Lipid panel  -     TSH 3rd Generation  -     PSA  -     Urinalysis with microscopic  -     Vitamin D 25 hydroxy  -     Vitamin B12    Elevated cholesterol  Assessment & Plan:  Lab Results   Component Value Date    CHOL 212 (H) 07/22/2024    CHOL 215 (H) 08/19/2023    CHOL 206 (H) 07/16/2022     Lab Results   Component Value Date    HDL 28 (L) 07/22/2024    HDL 34 (L) 08/19/2023    HDL 36 (L) 07/16/2022     Lab Results   Component Value Date    LDLCALC SEE COMMENT 07/22/2024    LDLCALC 146 (H) 08/19/2023    LDLCALC 136 (H) 07/16/2022     Lab Results   Component Value Date    TRIG 553 (H) 07/22/2024    TRIG 211 (H) 08/19/2023    TRIG 195 (H) 07/16/2022     CT calcium score 0      Orders:  -     Lipid panel    Colon cancer screening  Assessment & Plan:  Due June 2026      BMI  34.0-34.9,adult  Assessment & Plan:  Check labs  Check insulin level    Orders:  -     Vitamin D 25 hydroxy  -     Vitamin B12  -     Insulin    Snoring  Assessment & Plan:  Asked pt to have discussion with Dr Curtis about sleep study . Referral placed    Orders:  -     Vitamin D 25 hydroxy  -     Vitamin B12  -     Ambulatory referral to Pulmonology; Future    Parietoalveolar pneumopathy (CMS/HCC)  Assessment & Plan:  Noted on CT scan when had CT calcium score  Saw Dr Curtis  Rec CT chest April 2025 and repeat PFT in Nov if CT stable  Pt has not had the CT b/c feels oka d n PFT were ok ,  does admit to some COS with exercise due to inactiviity from his back issue  Rec he follow up as above to eval if any inhalers or interventions might be helpful in longer term management of his lungs      Degenerative lumbar spinal stenosis  Assessment & Plan:  See above meloxica, , lyrica and injection coming up       Allergic rhinitis due to food  Assessment & Plan:  astelin      Abnormal CT lung screening  Assessment & Plan:  Saw Dr Curtis  Rec folow up per his rec - reviewed  Have Tdap today flu in fall and Prevnar 20 rec in a month      Other orders  -     Tdap vaccine (>/= 7 yrs old)(ADACEL, BOOSTRIX) IM        Patient agrees and verbalizes understanding of medication and treatment plan discussed at today's visit.  Patient was instructed to call or follow-up if symptoms persist or worsen.         Dona Pascual,       This note was created with voice recognition software. Inadvertent dictation errors should be disregarded. Please contact my office for clarification.

## 2025-07-28 NOTE — ASSESSMENT & PLAN NOTE
Have labs today   Tdap today   Return for Prevnar 20 in a month  Get flu shot this year  Colon due 6/2025

## 2025-07-28 NOTE — ASSESSMENT & PLAN NOTE
Lab Results   Component Value Date    CHOL 212 (H) 07/22/2024    CHOL 215 (H) 08/19/2023    CHOL 206 (H) 07/16/2022     Lab Results   Component Value Date    HDL 28 (L) 07/22/2024    HDL 34 (L) 08/19/2023    HDL 36 (L) 07/16/2022     Lab Results   Component Value Date    LDLCALC SEE COMMENT 07/22/2024    LDLCALC 146 (H) 08/19/2023    LDLCALC 136 (H) 07/16/2022     Lab Results   Component Value Date    TRIG 553 (H) 07/22/2024    TRIG 211 (H) 08/19/2023    TRIG 195 (H) 07/16/2022     CT calcium score 0

## 2025-07-28 NOTE — ASSESSMENT & PLAN NOTE
Noted on CT scan when had CT calcium score  Saw Dr Curtis  Rec CT chest April 2025 and repeat PFT in Nov if CT stable  Pt has not had the CT b/c feels oka d n PFT were ok ,  does admit to some COS with exercise due to inactiviity from his back issue  Rec he follow up as above to eval if any inhalers or interventions might be helpful in longer term management of his lungs

## 2025-07-28 NOTE — ASSESSMENT & PLAN NOTE
Saw Dr Ever fernández up per his rec - reviewed  Have Tdap today flu in fall and Prevnar 20 rec in a month

## 2025-07-30 LAB
25(OH)D3+25(OH)D2 SERPL-MCNC: 44 NG/ML (ref 30–100)
ALBUMIN SERPL-MCNC: 4 G/DL (ref 3.6–5.1)
ALBUMIN/GLOB SERPL: 1.7 (CALC) (ref 1–2.5)
ALP SERPL-CCNC: 56 U/L (ref 35–144)
ALT SERPL-CCNC: 35 U/L (ref 9–46)
AST SERPL-CCNC: 42 U/L (ref 10–35)
BASOPHILS # BLD AUTO: 49 CELLS/UL (ref 0–200)
BASOPHILS NFR BLD AUTO: 0.9 %
BILIRUB SERPL-MCNC: 0.6 MG/DL (ref 0.2–1.2)
BUN SERPL-MCNC: 24 MG/DL (ref 7–25)
BUN/CREAT SERPL: ABNORMAL (CALC) (ref 6–22)
CALCIUM SERPL-MCNC: 9 MG/DL (ref 8.6–10.3)
CHLORIDE SERPL-SCNC: 106 MMOL/L (ref 98–110)
CHOLEST SERPL-MCNC: 200 MG/DL
CHOLEST/HDLC SERPL: 6.1 (CALC)
CO2 SERPL-SCNC: 25 MMOL/L (ref 20–32)
CREAT SERPL-MCNC: 1.2 MG/DL (ref 0.7–1.3)
EGFRCR SERPLBLD CKD-EPI 2021: 71 ML/MIN/1.73M2
EOSINOPHIL # BLD AUTO: 416 CELLS/UL (ref 15–500)
EOSINOPHIL NFR BLD AUTO: 7.7 %
ERYTHROCYTE [DISTWIDTH] IN BLOOD BY AUTOMATED COUNT: 12.9 % (ref 11–15)
GLOBULIN SER CALC-MCNC: 2.3 G/DL (CALC) (ref 1.9–3.7)
GLUCOSE SERPL-MCNC: 105 MG/DL (ref 65–99)
HBA1C MFR BLD: 5.4 %
HCT VFR BLD AUTO: 41.5 % (ref 38.5–50)
HDLC SERPL-MCNC: 33 MG/DL
HGB BLD-MCNC: 14.1 G/DL (ref 13.2–17.1)
INSULIN SERPL-ACNC: NORMAL UIU/ML
LDLC SERPL CALC-MCNC: 138 MG/DL (CALC)
LYMPHOCYTES # BLD AUTO: 1625 CELLS/UL (ref 850–3900)
LYMPHOCYTES NFR BLD AUTO: 30.1 %
MCH RBC QN AUTO: 32 PG (ref 27–33)
MCHC RBC AUTO-ENTMCNC: 34 G/DL (ref 32–36)
MCV RBC AUTO: 94.3 FL (ref 80–100)
MONOCYTES # BLD AUTO: 675 CELLS/UL (ref 200–950)
MONOCYTES NFR BLD AUTO: 12.5 %
NEUTROPHILS # BLD AUTO: 2635 CELLS/UL (ref 1500–7800)
NEUTROPHILS NFR BLD AUTO: 48.8 %
NONHDLC SERPL-MCNC: 167 MG/DL (CALC)
PLATELET # BLD AUTO: 252 THOUSAND/UL (ref 140–400)
PMV BLD REES-ECKER: 9.4 FL (ref 7.5–12.5)
POTASSIUM SERPL-SCNC: 4.4 MMOL/L (ref 3.5–5.3)
PROT SERPL-MCNC: 6.3 G/DL (ref 6.1–8.1)
PSA SERPL-MCNC: 0.52 NG/ML
RBC # BLD AUTO: 4.4 MILLION/UL (ref 4.2–5.8)
SODIUM SERPL-SCNC: 140 MMOL/L (ref 135–146)
TRIGL SERPL-MCNC: 154 MG/DL
TSH SERPL-ACNC: 1.8 MIU/L (ref 0.4–4.5)
VIT B12 SERPL-MCNC: 331 PG/ML (ref 200–1100)
WBC # BLD AUTO: 5.4 THOUSAND/UL (ref 3.8–10.8)